# Patient Record
Sex: MALE | Race: WHITE | NOT HISPANIC OR LATINO
[De-identification: names, ages, dates, MRNs, and addresses within clinical notes are randomized per-mention and may not be internally consistent; named-entity substitution may affect disease eponyms.]

---

## 2020-12-11 ENCOUNTER — TRANSCRIPTION ENCOUNTER (OUTPATIENT)
Age: 65
End: 2020-12-11

## 2021-01-14 ENCOUNTER — APPOINTMENT (OUTPATIENT)
Dept: INTERNAL MEDICINE | Facility: CLINIC | Age: 66
End: 2021-01-14
Payer: COMMERCIAL

## 2021-01-14 ENCOUNTER — NON-APPOINTMENT (OUTPATIENT)
Age: 66
End: 2021-01-14

## 2021-01-14 VITALS
HEIGHT: 69 IN | TEMPERATURE: 98.8 F | RESPIRATION RATE: 15 BRPM | HEART RATE: 86 BPM | SYSTOLIC BLOOD PRESSURE: 142 MMHG | BODY MASS INDEX: 31.4 KG/M2 | OXYGEN SATURATION: 97 % | WEIGHT: 212 LBS | DIASTOLIC BLOOD PRESSURE: 100 MMHG

## 2021-01-14 DIAGNOSIS — Z80.0 FAMILY HISTORY OF MALIGNANT NEOPLASM OF DIGESTIVE ORGANS: ICD-10-CM

## 2021-01-14 DIAGNOSIS — Z87.730 PERSONAL HISTORY OF (CORRECTED) CLEFT LIP AND PALATE: ICD-10-CM

## 2021-01-14 DIAGNOSIS — H50.9 UNSPECIFIED STRABISMUS: ICD-10-CM

## 2021-01-14 DIAGNOSIS — Z82.0 FAMILY HISTORY OF EPILEPSY AND OTHER DISEASES OF THE NERVOUS SYSTEM: ICD-10-CM

## 2021-01-14 DIAGNOSIS — Z80.1 FAMILY HISTORY OF MALIGNANT NEOPLASM OF TRACHEA, BRONCHUS AND LUNG: ICD-10-CM

## 2021-01-14 DIAGNOSIS — Z86.69 PERSONAL HISTORY OF OTHER DISEASES OF THE NERVOUS SYSTEM AND SENSE ORGANS: ICD-10-CM

## 2021-01-14 PROCEDURE — 93000 ELECTROCARDIOGRAM COMPLETE: CPT

## 2021-01-14 PROCEDURE — 99386 PREV VISIT NEW AGE 40-64: CPT | Mod: 25

## 2021-01-14 PROCEDURE — 99213 OFFICE O/P EST LOW 20 MIN: CPT | Mod: 25

## 2021-01-14 PROCEDURE — 96127 BRIEF EMOTIONAL/BEHAV ASSMT: CPT

## 2021-01-14 PROCEDURE — 99072 ADDL SUPL MATRL&STAF TM PHE: CPT

## 2021-01-14 PROCEDURE — 36415 COLL VENOUS BLD VENIPUNCTURE: CPT

## 2021-01-14 RX ORDER — ELECTROLYTES/DEXTROSE
SOLUTION, ORAL ORAL DAILY
Qty: 30 | Refills: 0 | Status: ACTIVE | COMMUNITY
Start: 2021-01-14

## 2021-01-14 NOTE — HEALTH RISK ASSESSMENT
[Yes] : In the past 12 months have you used drugs other than those required for medical reasons? Yes [0] : 2) Feeling down, depressed, or hopeless: Not at all (0) [Patient reported colonoscopy was normal] : Patient reported colonoscopy was normal [HIV test declined] : HIV test declined [Hepatitis C test offered] : Hepatitis C test offered [Employed] : employed [] :  [] : No [de-identified] : rare [[id number]] : 0 [de-identified] : marijuana occasionally [FreeTextEntry2] :  [ColonoscopyDate] : 01/10

## 2021-01-14 NOTE — PHYSICAL EXAM
[No Acute Distress] : no acute distress [Well Nourished] : well nourished [Well Developed] : well developed [Well-Appearing] : well-appearing [Normal Voice/Communication] : normal voice/communication [Normal Sclera/Conjunctiva] : normal sclera/conjunctiva [PERRL] : pupils equal round and reactive to light [EOMI] : extraocular movements intact [Normal Outer Ear/Nose] : the outer ears and nose were normal in appearance [Normal Oropharynx] : the oropharynx was normal [Normal Nasal Mucosa] : the nasal mucosa was normal [Normal TMs] : both tympanic membranes were normal [No JVD] : no jugular venous distention [No Lymphadenopathy] : no lymphadenopathy [Supple] : supple [Thyroid Normal, No Nodules] : the thyroid was normal and there were no nodules present [No Respiratory Distress] : no respiratory distress  [No Accessory Muscle Use] : no accessory muscle use [Clear to Auscultation] : lungs were clear to auscultation bilaterally [Normal Rate] : normal rate  [Regular Rhythm] : with a regular rhythm [Normal S1, S2] : normal S1 and S2 [No Murmur] : no murmur heard [No Carotid Bruits] : no carotid bruits [No Abdominal Bruit] : a ~M bruit was not heard ~T in the abdomen [No Edema] : there was no peripheral edema [No Palpable Aorta] : no palpable aorta [No Extremity Clubbing/Cyanosis] : no extremity clubbing/cyanosis [Soft] : abdomen soft [Non Tender] : non-tender [Non-distended] : non-distended [No Masses] : no abdominal mass palpated [No HSM] : no HSM [Normal Bowel Sounds] : normal bowel sounds [No CVA Tenderness] : no CVA  tenderness [No Spinal Tenderness] : no spinal tenderness [No Joint Swelling] : no joint swelling [No Rash] : no rash [No Skin Lesions] : no skin lesions [No Focal Deficits] : no focal deficits [Normal Affect] : the affect was normal [Alert and Oriented x3] : oriented to person, place, and time [Normal Mood] : the mood was normal [Normal Insight/Judgement] : insight and judgment were intact [de-identified] : Wears hearing aids

## 2021-01-14 NOTE — HISTORY OF PRESENT ILLNESS
[de-identified] : Here for CPE and to establish care.  he has not seen a doctor in 4-5 years\par \par He states he feels well overall\par \par He does report that he has erectile dysfunction.  He reports he has difficulty and getting and maintaining erections.  He reports his wife recently was treated for Hodgkin's lymphoma.  He states that for approximately 1 year they do not have any sexual activity.  He states now that she is in remission they have tried again but he has had erectile problems.  He denies feeling depressed or anxious\par \par He denies chest pain, shortness of breath, palpitations\par \par He denies history of heart disease

## 2021-01-14 NOTE — REVIEW OF SYSTEMS
[Hearing Loss] : hearing loss [Negative] : Neurological [Fever] : no fever [Chills] : no chills [Fatigue] : no fatigue [Recent Change In Weight] : ~T no recent weight change [Earache] : no earache [Nasal Discharge] : no nasal discharge [Sore Throat] : no sore throat [Chest Pain] : no chest pain [Palpitations] : no palpitations [Lower Ext Edema] : no lower extremity edema [Shortness Of Breath] : no shortness of breath [Wheezing] : no wheezing [Cough] : no cough [Dyspnea on Exertion] : not dyspnea on exertion [Abdominal Pain] : no abdominal pain [Nausea] : no nausea [Diarrhea] : no diarrhea [Vomiting] : no vomiting [Anxiety] : no anxiety [Depression] : no depression [FreeTextEntry8] : + ED

## 2021-01-14 NOTE — ASSESSMENT
[FreeTextEntry1] : \par Uncontrolled hypertension:\par -Risks of hypertension discussed with patient at length\par -I have advised a low-fat, low-cholesterol, low-salt, low-carb diet\par -I have advised he cut down on caffeine intake\par -Weight loss advised\par -Start amlodipine 5 mg daily  (R/B/A/side effects discussed)\par -Follow-up 4 to 6 weeks for BP check\par -We will check fasting labs\par \par Obesity:\par -BMI 31\par -risks of obesity discussed\par -benefits of weight loss discussed\par -low fat/low chol diet and low carbohydrate diet advised\par -small portion sizes encouraged\par -I advised avoidance of sugary drinks\par -weight loss advised\par \par Abnormal EKG:\par -EKG today normal sinus rhythm at 73 with PVCs, Q waves in III and aVF, poor R wave progression\par -He denies chest pain, shortness of breath, fatigue, dyspnea on exertion, palpitations\par -I have advised that he see cardiology-referred today\par \par Erectile dysfunction:\par -We will check labs including testosterone\par -Given abnormal EKG and uncontrolled hypertension I have advised he see cardiology prior to prescribing medications for ED\par \par HCM:\par \par CPE 2021\par \par Depression screenin2021 PHQ 2 score 0\par \par EKG 2021\par \par Flu shot: Advised 2021-he declined\par \par Tdap: Advised 2021-he declined\par \par Shingles vaccine: Advised 2021-he declined\par \par HIV testing: offered 2021-he declined\par \par Hepatitis C screening: Ordered today\par \par Colonoscopy: Last colonoscopy was in -I advised him that he is due for repeat colonoscopy–will refer to GI\par \par FIT test kit: Given to patient today\par \par PSA: We will check today\par

## 2021-01-15 ENCOUNTER — TRANSCRIPTION ENCOUNTER (OUTPATIENT)
Age: 66
End: 2021-01-15

## 2021-01-15 ENCOUNTER — NON-APPOINTMENT (OUTPATIENT)
Age: 66
End: 2021-01-15

## 2021-01-15 ENCOUNTER — APPOINTMENT (OUTPATIENT)
Dept: CARDIOLOGY | Facility: CLINIC | Age: 66
End: 2021-01-15
Payer: COMMERCIAL

## 2021-01-15 VITALS
HEIGHT: 69 IN | TEMPERATURE: 97.7 F | DIASTOLIC BLOOD PRESSURE: 98 MMHG | BODY MASS INDEX: 31.4 KG/M2 | WEIGHT: 212 LBS | OXYGEN SATURATION: 97 % | HEART RATE: 83 BPM | RESPIRATION RATE: 16 BRPM | SYSTOLIC BLOOD PRESSURE: 162 MMHG

## 2021-01-15 DIAGNOSIS — Z12.11 ENCOUNTER FOR SCREENING FOR MALIGNANT NEOPLASM OF COLON: ICD-10-CM

## 2021-01-15 DIAGNOSIS — Z82.49 FAMILY HISTORY OF ISCHEMIC HEART DISEASE AND OTHER DISEASES OF THE CIRCULATORY SYSTEM: ICD-10-CM

## 2021-01-15 DIAGNOSIS — Z13.31 ENCOUNTER FOR SCREENING FOR DEPRESSION: ICD-10-CM

## 2021-01-15 PROCEDURE — 93000 ELECTROCARDIOGRAM COMPLETE: CPT

## 2021-01-15 PROCEDURE — 99244 OFF/OP CNSLTJ NEW/EST MOD 40: CPT

## 2021-01-15 PROCEDURE — 99072 ADDL SUPL MATRL&STAF TM PHE: CPT

## 2021-01-15 NOTE — PHYSICAL EXAM
[Normal Conjunctiva] : the conjunctiva exhibited no abnormalities [General Appearance - In No Acute Distress] : no acute distress [Normal Oral Mucosa] : normal oral mucosa [Auscultation Breath Sounds / Voice Sounds] : lungs were clear to auscultation bilaterally [Abdomen Soft] : soft [Abdomen Tenderness] : non-tender [Nail Clubbing] : no clubbing of the fingernails [Abnormal Walk] : normal gait [Cyanosis, Localized] : no localized cyanosis [Oriented To Time, Place, And Person] : oriented to person, place, and time [Affect] : the affect was normal [FreeTextEntry1] : No JVD, no carotid bruits. [Normal Rate] : normal [Rhythm Regular] : regular [Normal S1] : normal S1 [Normal S2] : normal S2 [S3] : no S3 [S4] : no S4 [II] : a grade 2

## 2021-01-15 NOTE — DISCUSSION/SUMMARY
[FreeTextEntry1] : 1. Check echocardiogram and nuclear stress test given his frequent PVCs, uncontrolled diabetes and hypertension and risk factors for cardiovascular disease. He also has a murmur on exam.\par 2. Check carotid Doppler given his uncontrolled diabetes and hypertension.\par 3. Check Holter monitor to further evaluate his PVCs and evaluate his PVC burden.\par 4. Add telmisartan 40 mg daily for hypertension given his diabetes. Continue amlodipine.\par 5. Monitor BP at home, keep a log and bring to f/u.\par 6. Add rosuvastatin 10 mg daily for his dyslipidemia and diabetes.\par 7. He will follow-up with you for treatment of his new diabetes. Consider SGL2 inhibitor.\par 8.  Patient encouraged to work on diet to lose weight.\par 9.  Patient encouraged to work on a healthy diet high in lean protein, whole grains and vegetables, and lower in white flour and simple sugars.  Consider seeing a nutritionist.\par 10.  Follow up here after testing, and will make further recommendations at that time.

## 2021-01-15 NOTE — HISTORY OF PRESENT ILLNESS
[FreeTextEntry1] : Patient presents today having been in your office for initial evaluation yesterday. As part of that evaluation he had an EKG which was abnormal with some PVCs. Patient feels well and reports no significant physical complaints at this time. He does not do any regular exercise but reports no symptoms within his normal daily activities. He does state that he had an abnormal stress test 10 or more years ago and had a cardiac catheterization at [facility] which showed a "mild blockage". He has not seen any doctors in the last several years. He reports some shortness of breath when he exerts himself which he blames on his age, weight and deconditioning. He also has recently been having some issues with erectile dysfunction which also brought him to your attention. His wife unfortunately was diagnosed with lymphoma a few years ago and he did not have any sexual activity for some time. Thankfully his wife has been cured and now that he is attempting to try again he has been having erectile issues. His blood pressure was noted to be elevated yesterday and he was started on amlodipine. Patient denies chest pain, palpitations, orthopnea, presyncope, syncope.

## 2021-01-15 NOTE — ASSESSMENT
[FreeTextEntry1] : EKG: Sinus rhythm with borderline T wave changes. Frequent PVCs with bigeminy noted.\par \par 65-year-old man with no significant past medical history but who has not been seen by a doctor in several years who presents to me after presenting to your office yesterday and being found to have PVCs on his EKG as well is elevated blood pressure. Today his blood pressure remains elevated. I reviewed his blood work from yesterday and he is significantly diabetic with an A1c of nearly 11. His lipids are also a problem with elevated triglycerides, low HDL and borderline LDL. His LDL is certainly elevated in the setting of diabetes. His EKG again shows PVCs which are fairly frequent. He certainly will need better treatment for his blood pressure as well as ultimately treatment for his diabetes and his lipids. He will need some cardiac testing though given his EKG and his now multiple apparently uncontrolled risk factors for unclear period of time.

## 2021-01-17 ENCOUNTER — TRANSCRIPTION ENCOUNTER (OUTPATIENT)
Age: 66
End: 2021-01-17

## 2021-01-18 LAB
25(OH)D3 SERPL-MCNC: 18.6 NG/ML
ALBUMIN SERPL ELPH-MCNC: 4.3 G/DL
ALP BLD-CCNC: 83 U/L
ALT SERPL-CCNC: 32 U/L
ANION GAP SERPL CALC-SCNC: 14 MMOL/L
APPEARANCE: ABNORMAL
AST SERPL-CCNC: 21 U/L
BACTERIA: NEGATIVE
BASOPHILS # BLD AUTO: 0.05 K/UL
BASOPHILS NFR BLD AUTO: 0.8 %
BILIRUB SERPL-MCNC: 0.4 MG/DL
BILIRUBIN URINE: NEGATIVE
BLOOD URINE: NEGATIVE
BUN SERPL-MCNC: 12 MG/DL
CALCIUM SERPL-MCNC: 9.6 MG/DL
CHLORIDE SERPL-SCNC: 105 MMOL/L
CHOLEST SERPL-MCNC: 212 MG/DL
CO2 SERPL-SCNC: 18 MMOL/L
COLOR: NORMAL
CREAT SERPL-MCNC: 0.79 MG/DL
EOSINOPHIL # BLD AUTO: 0.1 K/UL
EOSINOPHIL NFR BLD AUTO: 1.6 %
ERYTHROCYTE [SEDIMENTATION RATE] IN BLOOD BY WESTERGREN METHOD: 8 MM/HR
ESTIMATED AVERAGE GLUCOSE: 263 MG/DL
GLUCOSE QUALITATIVE U: ABNORMAL
GLUCOSE SERPL-MCNC: 285 MG/DL
HBA1C MFR BLD HPLC: 10.8 %
HCT VFR BLD CALC: 49.2 %
HCV AB SER QL: NONREACTIVE
HCV S/CO RATIO: 0.06 S/CO
HDLC SERPL-MCNC: 34 MG/DL
HGB BLD-MCNC: 16.5 G/DL
HYALINE CASTS: 1 /LPF
IMM GRANULOCYTES NFR BLD AUTO: 0.3 %
KETONES URINE: NORMAL
LDLC SERPL CALC-MCNC: 123 MG/DL
LEUKOCYTE ESTERASE URINE: NEGATIVE
LYMPHOCYTES # BLD AUTO: 1.7 K/UL
LYMPHOCYTES NFR BLD AUTO: 27.6 %
MAN DIFF?: NORMAL
MCHC RBC-ENTMCNC: 29.3 PG
MCHC RBC-ENTMCNC: 33.5 GM/DL
MCV RBC AUTO: 87.2 FL
MICROSCOPIC-UA: NORMAL
MONOCYTES # BLD AUTO: 0.36 K/UL
MONOCYTES NFR BLD AUTO: 5.8 %
NEUTROPHILS # BLD AUTO: 3.93 K/UL
NEUTROPHILS NFR BLD AUTO: 63.9 %
NITRITE URINE: NEGATIVE
NONHDLC SERPL-MCNC: 177 MG/DL
PH URINE: 5
PLATELET # BLD AUTO: 201 K/UL
POTASSIUM SERPL-SCNC: 4.3 MMOL/L
PROT SERPL-MCNC: 6.9 G/DL
PROTEIN URINE: NEGATIVE
PSA SERPL-MCNC: 0.98 NG/ML
RBC # BLD: 5.64 M/UL
RBC # FLD: 13.2 %
RED BLOOD CELLS URINE: 0 /HPF
SODIUM SERPL-SCNC: 137 MMOL/L
SPECIFIC GRAVITY URINE: 1.03
SQUAMOUS EPITHELIAL CELLS: 0 /HPF
T4 FREE SERPL-MCNC: 1 NG/DL
TRIGL SERPL-MCNC: 271 MG/DL
TSH SERPL-ACNC: 1.81 UIU/ML
UROBILINOGEN URINE: NORMAL
WBC # FLD AUTO: 6.16 K/UL
WHITE BLOOD CELLS URINE: 0 /HPF

## 2021-01-19 LAB
TESTOST BND SERPL-MCNC: 4.5 PG/ML
TESTOST SERPL-MCNC: 153.2 NG/DL

## 2021-01-20 ENCOUNTER — NON-APPOINTMENT (OUTPATIENT)
Age: 66
End: 2021-01-20

## 2021-01-26 ENCOUNTER — APPOINTMENT (OUTPATIENT)
Dept: ENDOCRINOLOGY | Facility: CLINIC | Age: 66
End: 2021-01-26
Payer: COMMERCIAL

## 2021-01-26 VITALS
HEART RATE: 85 BPM | HEIGHT: 69 IN | RESPIRATION RATE: 15 BRPM | BODY MASS INDEX: 30.36 KG/M2 | SYSTOLIC BLOOD PRESSURE: 122 MMHG | WEIGHT: 205 LBS | DIASTOLIC BLOOD PRESSURE: 76 MMHG | OXYGEN SATURATION: 98 % | TEMPERATURE: 99.2 F

## 2021-01-26 LAB — GLUCOSE BLDC GLUCOMTR-MCNC: 275

## 2021-01-26 PROCEDURE — 82962 GLUCOSE BLOOD TEST: CPT

## 2021-01-26 PROCEDURE — 99072 ADDL SUPL MATRL&STAF TM PHE: CPT

## 2021-01-26 PROCEDURE — 99204 OFFICE O/P NEW MOD 45 MIN: CPT | Mod: 25

## 2021-02-01 ENCOUNTER — APPOINTMENT (OUTPATIENT)
Dept: CARDIOLOGY | Facility: CLINIC | Age: 66
End: 2021-02-01

## 2021-02-02 NOTE — PHYSICAL EXAM
[Alert] : alert [No Acute Distress] : no acute distress [Normal Sclera/Conjunctiva] : normal sclera/conjunctiva [PERRL] : pupils equal, round and reactive to light [Normal Outer Ear/Nose] : the ears and nose were normal in appearance [No Neck Mass] : no neck mass was observed [Thyroid Not Enlarged] : the thyroid was not enlarged [No Respiratory Distress] : no respiratory distress [Regular Rhythm] : with a regular rhythm [Carotids Normal] : carotid pulses were normal with no bruits [Not Tender] : non-tender [No Stigmata of Cushings Syndrome] : no stigmata of Cushings Syndrome [Normal Gait] : normal gait [No Joint Swelling] : no joint swelling seen [No Tremors] : no tremors [Oriented x3] : oriented to person, place, and time [Kyphosis] : no kyphosis present [Foot Ulcers] : no foot ulcers [Acne] : no acne

## 2021-02-02 NOTE — ASSESSMENT
[Diabetes Foot Care] : diabetes foot care [Long Term Vascular Complications] : long term vascular complications of diabetes [Carbohydrate Consistent Diet] : carbohydrate consistent diet [Importance of Diet and Exercise] : importance of diet and exercise to improve glycemic control, achieve weight loss and improve cardiovascular health [Exercise/Effect on Glucose] : exercise/effect on glucose [Self Monitoring of Blood Glucose] : self monitoring of blood glucose [Retinopathy Screening] : Patient was referred to ophthalmology for retinopathy screening [Weight Loss] : weight loss [Diabetic Medications] : Risks and benefits of diabetic medications were discussed [FreeTextEntry1] : Diet and exercise was discussed\par FS glucose was 275- post [place] juice\par  He will monitor his glucose daily- he will call insurancw for a noame of meter\par We will continue the Metformin therapy\par Dietary consult\par Low carb diet and wt loss discussed.\par Ex daily\par Return in 3 mo

## 2021-02-02 NOTE — HISTORY OF PRESENT ILLNESS
[FreeTextEntry1] : 65 yoM with recent DX of Type 2 Diabetes. He had noticed nocturia-1x/night.He does little exercise and was eating large bowls of pasta. His wt has been stable\par FH- neg for DM\par He has lost  7 lbs x 2 wks due to diet\par  No blurry vision; has chronic pain in L second and third toes [Continuous Glucose Monitoring] : Continuous Glucose Monitoring: No [Finger Stick] : Finger Stick: No

## 2021-02-15 ENCOUNTER — TRANSCRIPTION ENCOUNTER (OUTPATIENT)
Age: 66
End: 2021-02-15

## 2021-02-17 ENCOUNTER — TRANSCRIPTION ENCOUNTER (OUTPATIENT)
Age: 66
End: 2021-02-17

## 2021-02-17 ENCOUNTER — APPOINTMENT (OUTPATIENT)
Dept: CARDIOLOGY | Facility: CLINIC | Age: 66
End: 2021-02-17
Payer: COMMERCIAL

## 2021-02-17 PROCEDURE — 93880 EXTRACRANIAL BILAT STUDY: CPT

## 2021-02-17 PROCEDURE — 99072 ADDL SUPL MATRL&STAF TM PHE: CPT

## 2021-02-17 PROCEDURE — 93306 TTE W/DOPPLER COMPLETE: CPT

## 2021-02-17 RX ADMIN — PERFLUTREN MG/ML: 6.52 INJECTION, SUSPENSION INTRAVENOUS at 00:00

## 2021-02-18 ENCOUNTER — TRANSCRIPTION ENCOUNTER (OUTPATIENT)
Age: 66
End: 2021-02-18

## 2021-02-18 LAB — HEMOCCULT STL QL IA: NEGATIVE

## 2021-02-22 RX ORDER — PERFLUTREN 6.52 MG/ML
6.52 INJECTION, SUSPENSION INTRAVENOUS
Qty: 2 | Refills: 0 | Status: COMPLETED | OUTPATIENT
Start: 2021-02-17

## 2021-03-01 ENCOUNTER — APPOINTMENT (OUTPATIENT)
Dept: INTERNAL MEDICINE | Facility: CLINIC | Age: 66
End: 2021-03-01
Payer: COMMERCIAL

## 2021-03-01 VITALS
HEART RATE: 55 BPM | RESPIRATION RATE: 15 BRPM | DIASTOLIC BLOOD PRESSURE: 74 MMHG | WEIGHT: 195 LBS | OXYGEN SATURATION: 99 % | HEIGHT: 69 IN | SYSTOLIC BLOOD PRESSURE: 120 MMHG | TEMPERATURE: 99 F | BODY MASS INDEX: 28.88 KG/M2

## 2021-03-01 DIAGNOSIS — I10 ESSENTIAL (PRIMARY) HYPERTENSION: ICD-10-CM

## 2021-03-01 DIAGNOSIS — R94.31 ABNORMAL ELECTROCARDIOGRAM [ECG] [EKG]: ICD-10-CM

## 2021-03-01 LAB — GLUCOSE BLDC GLUCOMTR-MCNC: 144

## 2021-03-01 PROCEDURE — 36415 COLL VENOUS BLD VENIPUNCTURE: CPT

## 2021-03-01 PROCEDURE — 99072 ADDL SUPL MATRL&STAF TM PHE: CPT

## 2021-03-01 PROCEDURE — 99214 OFFICE O/P EST MOD 30 MIN: CPT | Mod: 25

## 2021-03-01 PROCEDURE — 82962 GLUCOSE BLOOD TEST: CPT

## 2021-03-01 NOTE — PHYSICAL EXAM
[No Acute Distress] : no acute distress [Well Nourished] : well nourished [Well Developed] : well developed [Well-Appearing] : well-appearing [Normal Voice/Communication] : normal voice/communication [Normal Sclera/Conjunctiva] : normal sclera/conjunctiva [PERRL] : pupils equal round and reactive to light [Normal Oropharynx] : the oropharynx was normal [No JVD] : no jugular venous distention [No Lymphadenopathy] : no lymphadenopathy [Supple] : supple [Thyroid Normal, No Nodules] : the thyroid was normal and there were no nodules present [No Respiratory Distress] : no respiratory distress  [No Accessory Muscle Use] : no accessory muscle use [Clear to Auscultation] : lungs were clear to auscultation bilaterally [Normal Rate] : normal rate  [Regular Rhythm] : with a regular rhythm [Normal S1, S2] : normal S1 and S2 [No Murmur] : no murmur heard [No Abdominal Bruit] : a ~M bruit was not heard ~T in the abdomen [No Edema] : there was no peripheral edema [No Palpable Aorta] : no palpable aorta [No Extremity Clubbing/Cyanosis] : no extremity clubbing/cyanosis [No Rash] : no rash [Normal Affect] : the affect was normal [Alert and Oriented x3] : oriented to person, place, and time [Normal Mood] : the mood was normal [Normal Insight/Judgement] : insight and judgment were intact

## 2021-03-01 NOTE — REVIEW OF SYSTEMS
[Negative] : Psychiatric [Fever] : no fever [Chills] : no chills [Fatigue] : no fatigue [Chest Pain] : no chest pain [Palpitations] : no palpitations [Lower Ext Edema] : no lower extremity edema [Shortness Of Breath] : no shortness of breath [Wheezing] : no wheezing [Cough] : no cough [Dyspnea on Exertion] : not dyspnea on exertion [Abdominal Pain] : no abdominal pain [Nausea] : no nausea [Diarrhea] : no diarrhea [Vomiting] : no vomiting

## 2021-03-01 NOTE — ASSESSMENT
[FreeTextEntry1] : \par \par DM type 2:\par -FS today 144\par -now followed by Dr Carroll (Endocrine)\par -hgA1c 10.8 2021 and he is now on metformin 500mg BID\par -will check urine A:C\par -now on crestor\par -Ophthalmology-referred previously\par -Foot exam: will check at next visit\par -he ha slost 17 lbs with diet modification\par -his home FS have improved\par \par Hypertension:\par -now on amlodipine 5 mg daily and telmisartan 40mg daily\par -check cmp\par \par Obesity:\par -he has now lost 17 lbs andf BMI 28.8\par -low fat/low chol diet and low carbohydrate diet advised\par -small portion sizes encouraged\par -I advised avoidance of sugary drinks\par -weight loss advised\par \par Abnormal EKG:\par -seeing cardiology and scheduled for stress test this week\par -ECHO, carotid, and holter has been completed\par \par Erectile dysfunction?low testosterone\par -Given abnormal EKG and uncontrolled hypertension I have advised he see cardiology prior to prescribing medications for ED\par -f/u with Endocrine\par \par HCM:\par \par CPE 2021\par \par Depression screenin2021 PHQ 2 score 0\par \par EKG 2021\par \par Flu shot: Advised 2021-he declined\par \par Tdap: Advised 2021-he declined\par \par Shingles vaccine: Advised 2021-he declined\par \par Covid vaccine: received first shot last week\par \par HIV testing: offered 2021-he declined\par \par Hepatitis C screenin2021 negative\par \par Colonoscopy: Last colonoscopy was in -I advised him that he is due for repeat colonoscopy–he was previously referred to GI-he states he will make appt\par \par FIT test: 2021 negative\par \par PSA: 0.98 2021\par \par F/U 3 months.  Labs drawn in office today\par

## 2021-03-01 NOTE — HISTORY OF PRESENT ILLNESS
[de-identified] : Here for follow up of his DM\par \par he states he has been eating healthy and lost 17 lbs\par \par FS at home 118-180\par \par He states he feels well and denies any complaints

## 2021-03-02 LAB
ALBUMIN SERPL ELPH-MCNC: 4.7 G/DL
ALP BLD-CCNC: 79 U/L
ALT SERPL-CCNC: 27 U/L
ANION GAP SERPL CALC-SCNC: 14 MMOL/L
AST SERPL-CCNC: 17 U/L
BILIRUB SERPL-MCNC: 0.6 MG/DL
BUN SERPL-MCNC: 18 MG/DL
CALCIUM SERPL-MCNC: 9.9 MG/DL
CHLORIDE SERPL-SCNC: 104 MMOL/L
CO2 SERPL-SCNC: 22 MMOL/L
CREAT SERPL-MCNC: 1.05 MG/DL
GLUCOSE SERPL-MCNC: 136 MG/DL
POTASSIUM SERPL-SCNC: 4.2 MMOL/L
PROT SERPL-MCNC: 7.5 G/DL
SODIUM SERPL-SCNC: 140 MMOL/L

## 2021-03-03 ENCOUNTER — APPOINTMENT (OUTPATIENT)
Dept: CARDIOLOGY | Facility: CLINIC | Age: 66
End: 2021-03-03
Payer: COMMERCIAL

## 2021-03-03 ENCOUNTER — TRANSCRIPTION ENCOUNTER (OUTPATIENT)
Age: 66
End: 2021-03-03

## 2021-03-03 ENCOUNTER — RX RENEWAL (OUTPATIENT)
Age: 66
End: 2021-03-03

## 2021-03-03 PROCEDURE — 78452 HT MUSCLE IMAGE SPECT MULT: CPT

## 2021-03-03 PROCEDURE — A9500: CPT

## 2021-03-03 PROCEDURE — 93015 CV STRESS TEST SUPVJ I&R: CPT

## 2021-03-03 PROCEDURE — 99072 ADDL SUPL MATRL&STAF TM PHE: CPT

## 2021-03-03 RX ORDER — REGADENOSON 0.08 MG/ML
0.4 INJECTION, SOLUTION INTRAVENOUS
Qty: 4 | Refills: 0 | Status: COMPLETED | OUTPATIENT
Start: 2021-03-03

## 2021-03-03 RX ADMIN — REGADENOSON 0 MG/5ML: 0.08 INJECTION, SOLUTION INTRAVENOUS at 00:00

## 2021-03-10 RX ORDER — KIT FOR THE PREPARATION OF TECHNETIUM TC99M SESTAMIBI 1 MG/5ML
INJECTION, POWDER, LYOPHILIZED, FOR SOLUTION PARENTERAL
Refills: 0 | Status: COMPLETED | OUTPATIENT
Start: 2021-03-10

## 2021-03-10 RX ADMIN — KIT FOR THE PREPARATION OF TECHNETIUM TC99M SESTAMIBI 0: 1 INJECTION, POWDER, LYOPHILIZED, FOR SOLUTION PARENTERAL at 00:00

## 2021-03-11 ENCOUNTER — APPOINTMENT (OUTPATIENT)
Dept: CARDIOLOGY | Facility: CLINIC | Age: 66
End: 2021-03-11
Payer: COMMERCIAL

## 2021-03-11 VITALS
SYSTOLIC BLOOD PRESSURE: 124 MMHG | BODY MASS INDEX: 28.44 KG/M2 | HEART RATE: 79 BPM | WEIGHT: 192 LBS | HEIGHT: 69 IN | RESPIRATION RATE: 16 BRPM | TEMPERATURE: 97.3 F | OXYGEN SATURATION: 100 % | DIASTOLIC BLOOD PRESSURE: 76 MMHG

## 2021-03-11 PROCEDURE — 99072 ADDL SUPL MATRL&STAF TM PHE: CPT

## 2021-03-11 PROCEDURE — 99214 OFFICE O/P EST MOD 30 MIN: CPT

## 2021-03-11 RX ORDER — AMLODIPINE BESYLATE 5 MG/1
5 TABLET ORAL DAILY
Qty: 90 | Refills: 1 | Status: DISCONTINUED | COMMUNITY
Start: 2021-01-14 | End: 2021-03-11

## 2021-03-11 NOTE — ASSESSMENT
[FreeTextEntry1] : Holter monitor January 15, 2021 demonstrated presenting rhythm of sinus with average heart rate of 78 bpm, maximum 109, minimum 61 bpm. Frequent PVCs totaling 98 an hour with bigeminy and a total of 46 couplets were noted. Occasional PACs totaling 26 an hour with some bigeminy noted as well.\par \par Echocardiogram February 17, 2021 demonstrated left ventricle normal in size with mildly reduced function and an ejection fraction of 45 to 50%. Mild mitral valve prolapse with trace mitral regurgitation noted.\par \par Carotid Doppler February 17, 2021 showed mild plaque bilaterally and mild stenosis bilaterally.\par \par Nuclear stress test March 3, 2021 was a pharmacologic study which was tolerated well. Blood pressure sponsor was normal. EKG showed no evidence of ischemia. Evaluation of nuclear imaging showed no evidence of ischemia or infarct and ejection fraction of 53%.\par \par 65-year-old man with past medical history of hypertension, diabetes who presents to me for follow-up and review of his testing for initial evaluation of PVCs and hypertension. Cardiac testing shows evidence of mild cardiomyopathy on echocardiogram and stress testing with no evidence of ischemia or infarction. He does have very frequent PVCs and certainly this could be related. Carotid shows only mild disease. I would like to change his amlodipine to metoprolol to try and suppress PVCs and to treat his cardiomyopathy. I will not change telmisartan for now but would consider the addition of Entresto instead. He should have repeat blood work for lipids now that he is on atorvastatin which he is tolerating. There is no evidence of acute heart failure at this time.

## 2021-03-11 NOTE — HISTORY OF PRESENT ILLNESS
[FreeTextEntry1] : Patient presents back to the office today feeling well and offering no complaints. He continues to be active and is able to do so without limitation. He is actually managed to lose 20 pounds through improved diet as last I saw him. Blood pressures have been in the 100s to 110s over 60s to 70s since the addition of telmisartan. He tolerated that and the rosuvastatin without a problem. Patient denies chest pain, shortness of breath, palpitations, orthopnea, presyncope, syncope.

## 2021-03-11 NOTE — PHYSICAL EXAM
[General Appearance - In No Acute Distress] : no acute distress [Normal Conjunctiva] : the conjunctiva exhibited no abnormalities [Normal Oral Mucosa] : normal oral mucosa [Auscultation Breath Sounds / Voice Sounds] : lungs were clear to auscultation bilaterally [Abdomen Soft] : soft [Abdomen Tenderness] : non-tender [Abnormal Walk] : normal gait [Nail Clubbing] : no clubbing of the fingernails [Cyanosis, Localized] : no localized cyanosis [Oriented To Time, Place, And Person] : oriented to person, place, and time [Affect] : the affect was normal [Normal Rate] : normal [Rhythm Regular] : regular [Normal S1] : normal S1 [Normal S2] : normal S2 [II] : a grade 2 [FreeTextEntry1] : No JVD, no carotid bruits. [S3] : no S3 [S4] : no S4

## 2021-03-11 NOTE — DISCUSSION/SUMMARY
[FreeTextEntry1] : 1. No additional cardiac testing at this time. \par 2. Change amlodipine to Toprol-XL 25 mg daily to treat PVCs and cardiomyopathy.  Repeat Holter prior to follow-up.\par 3. Continue other current cardiac meds in doses as noted above for cardiomyopathy, diabetes, dyslipidemia and hypertension.\par 4. Monitor BP at home, keep a log and bring to f/u.\par 5. He will follow-up with you for treatment of his new diabetes. Consider SGL2 inhibitor.\par 6. Patient encouraged to work on diet to lose weight.\par 7. Patient encouraged to work on a healthy diet high in lean protein, whole grains and vegetables, and lower in white flour and simple sugars.  \par 8. Patient is encouraged to exercise at least 30 minutes a day everyday of the week.\par 9. There is no contraindication to him using Viagra or to sexual activity. Instructed him on avoiding nitrates if he is using Viagra.\par 10. Blood work for lipids now on Crestor.\par 11. Follow up here after testing, and will make further recommendations at that time.

## 2021-03-12 ENCOUNTER — TRANSCRIPTION ENCOUNTER (OUTPATIENT)
Age: 66
End: 2021-03-12

## 2021-03-12 RX ORDER — SILDENAFIL 100 MG/1
100 TABLET, FILM COATED ORAL
Qty: 10 | Refills: 1 | Status: ACTIVE | COMMUNITY
Start: 2021-03-12 | End: 1900-01-01

## 2021-03-23 ENCOUNTER — TRANSCRIPTION ENCOUNTER (OUTPATIENT)
Age: 66
End: 2021-03-23

## 2021-04-05 ENCOUNTER — RX RENEWAL (OUTPATIENT)
Age: 66
End: 2021-04-05

## 2021-04-07 ENCOUNTER — TRANSCRIPTION ENCOUNTER (OUTPATIENT)
Age: 66
End: 2021-04-07

## 2021-04-27 ENCOUNTER — APPOINTMENT (OUTPATIENT)
Dept: ENDOCRINOLOGY | Facility: CLINIC | Age: 66
End: 2021-04-27
Payer: COMMERCIAL

## 2021-04-27 VITALS
SYSTOLIC BLOOD PRESSURE: 112 MMHG | HEART RATE: 74 BPM | BODY MASS INDEX: 28.14 KG/M2 | WEIGHT: 190 LBS | DIASTOLIC BLOOD PRESSURE: 72 MMHG | HEIGHT: 69 IN | OXYGEN SATURATION: 96 % | TEMPERATURE: 96.5 F | RESPIRATION RATE: 15 BRPM

## 2021-04-27 LAB — GLUCOSE BLDC GLUCOMTR-MCNC: 166

## 2021-04-27 PROCEDURE — 99215 OFFICE O/P EST HI 40 MIN: CPT | Mod: 25

## 2021-04-27 PROCEDURE — 82962 GLUCOSE BLOOD TEST: CPT | Mod: NC

## 2021-04-27 PROCEDURE — 99072 ADDL SUPL MATRL&STAF TM PHE: CPT

## 2021-04-27 RX ORDER — TELMISARTAN 40 MG/1
40 TABLET ORAL DAILY
Qty: 90 | Refills: 1 | Status: COMPLETED | COMMUNITY
Start: 2021-01-15 | End: 2021-04-27

## 2021-04-27 RX ORDER — ERGOCALCIFEROL 1.25 MG/1
1.25 MG CAPSULE ORAL
Qty: 12 | Refills: 0 | Status: COMPLETED | COMMUNITY
Start: 2021-01-18 | End: 2021-04-27

## 2021-04-27 NOTE — REVIEW OF SYSTEMS
[Erectile Dysfunction] : erectile dysfunction [Negative] : Psychiatric [Lower Ext Edema] : no lower extremity edema [Decreased Libido] : no decreased libido [Pain/Numbness of Digits] : no pain/numbness of digits

## 2021-04-27 NOTE — PHYSICAL EXAM
[Alert] : alert [No Acute Distress] : no acute distress [Normal Sclera/Conjunctiva] : normal sclera/conjunctiva [Normal Outer Ear/Nose] : the ears and nose were normal in appearance [No Neck Mass] : no neck mass was observed [Thyroid Not Enlarged] : the thyroid was not enlarged [No Respiratory Distress] : no respiratory distress

## 2021-04-27 NOTE — HISTORY OF PRESENT ILLNESS
[Finger Stick] : Finger Stick: Yes [FreeTextEntry1] : Pt with Type 2 DM x 1yr- on metformin 500mg  bid\par On low carb diet\par Ex- little\par No retinopathy or neuropathy or CKD\par uses one touch verio [Hypoglycemia] : Patient is not hypoglycemic. [de-identified] : 130 [de-identified] : 130 [de-identified] : 134

## 2021-04-27 NOTE — ASSESSMENT
[FreeTextEntry1] : 1- Cont metformin\par 2- diet and ex\par 3- eye and foot care\par 4- labs drawn\par Ret 3 mo  \par \par Will add testosterone gel for hypogonadism/ ED

## 2021-04-28 LAB
ALBUMIN SERPL ELPH-MCNC: 4.6 G/DL
ALP BLD-CCNC: 77 U/L
ALT SERPL-CCNC: 29 U/L
ANION GAP SERPL CALC-SCNC: 11 MMOL/L
AST SERPL-CCNC: 23 U/L
BILIRUB SERPL-MCNC: 0.6 MG/DL
BUN SERPL-MCNC: 17 MG/DL
CALCIUM SERPL-MCNC: 9.5 MG/DL
CHLORIDE SERPL-SCNC: 104 MMOL/L
CHOLEST SERPL-MCNC: 124 MG/DL
CO2 SERPL-SCNC: 23 MMOL/L
CREAT SERPL-MCNC: 0.89 MG/DL
ESTIMATED AVERAGE GLUCOSE: 140 MG/DL
GLUCOSE SERPL-MCNC: 166 MG/DL
HBA1C MFR BLD HPLC: 6.5 %
HDLC SERPL-MCNC: 31 MG/DL
LDLC SERPL CALC-MCNC: 57 MG/DL
NONHDLC SERPL-MCNC: 93 MG/DL
POTASSIUM SERPL-SCNC: 4.6 MMOL/L
PROT SERPL-MCNC: 7 G/DL
SODIUM SERPL-SCNC: 138 MMOL/L
TRIGL SERPL-MCNC: 178 MG/DL

## 2021-06-04 ENCOUNTER — APPOINTMENT (OUTPATIENT)
Dept: INTERNAL MEDICINE | Facility: CLINIC | Age: 66
End: 2021-06-04
Payer: COMMERCIAL

## 2021-06-04 VITALS
WEIGHT: 200 LBS | HEIGHT: 69 IN | OXYGEN SATURATION: 99 % | HEART RATE: 63 BPM | RESPIRATION RATE: 15 BRPM | BODY MASS INDEX: 29.62 KG/M2 | SYSTOLIC BLOOD PRESSURE: 126 MMHG | DIASTOLIC BLOOD PRESSURE: 80 MMHG | TEMPERATURE: 96.4 F

## 2021-06-04 DIAGNOSIS — N52.9 MALE ERECTILE DYSFUNCTION, UNSPECIFIED: ICD-10-CM

## 2021-06-04 DIAGNOSIS — Z23 ENCOUNTER FOR IMMUNIZATION: ICD-10-CM

## 2021-06-04 PROCEDURE — 99072 ADDL SUPL MATRL&STAF TM PHE: CPT

## 2021-06-04 PROCEDURE — 99214 OFFICE O/P EST MOD 30 MIN: CPT

## 2021-06-04 NOTE — REVIEW OF SYSTEMS
[Negative] : Psychiatric [Fever] : no fever [Chills] : no chills [Fatigue] : no fatigue [Chest Pain] : no chest pain [Palpitations] : no palpitations [Lower Ext Edema] : no lower extremity edema [Shortness Of Breath] : no shortness of breath [Wheezing] : no wheezing [Cough] : no cough [Dyspnea on Exertion] : not dyspnea on exertion [Abdominal Pain] : no abdominal pain [Nausea] : no nausea [Diarrhea] : no diarrhea [Vomiting] : no vomiting [FreeTextEntry2] : gained 10 lbs [FreeTextEntry8] : + ED

## 2021-06-04 NOTE — ASSESSMENT
[FreeTextEntry1] : \par \par DM type 2:\par -HgA1c 6.5 2021\par -followed by Dr Carroll (Endocrine)\par - metformin 500mg BID\par -will check urine A:C\par -on crestor-LDL 57 2021\par -Ophthalmology-referred previously-he states he will make appt\par -Foot exam: 2021\par -he should adhere to low fat/low cholesterol diet, low carbohydrate diet and weight loss advised\par \par Hypertension:\par -now on Metoprolol XL  and telmisartan 40mg daily\par -BP at goal\par \par Obesity:\par -low fat/low chol diet and low carbohydrate diet advised\par -small portion sizes encouraged\par -I advised avoidance of sugary drinks\par -weight loss advised\par \par Cardiomyopathy/PVCs/Abnormal EKG:\par -seen by cardiology\par -ECHO: EF 45-50%\par -on telmisartan, Toprol XL, crestor\par -has cardiology f/u next week\par -he is asymptomatic\par \par Mild carotid stenosis:\par -carotid US with cardiology less than 50% B/L\par -on statin\par -LDL 57\par -discussed ASA use-he will discus with his cardiologist-he ha appt next week\par \par Erectile dysfunction/low testosterone\par -followed by Endocrine\par -viagra did not help\par -he is still waiting to get testosterone-will have RN reach out to pharmacy to see if there were any issues\par -will refer to urology-Dr Westfall\par \par HCM:\par \par CPE 2021\par \par Depression screenin2021 PHQ 2 score 0\par \par EKG 2021\par \par Flu shot: Advised 2021-he declined\par \par Tdap: Advised 2021-he declined\par \par Shingles vaccine: Advised 2021-he declined\par \par Covid vaccine: (Pf[employer]) 2021 and 3/19/2021\par \par HIV testing: offered 2021-he declined\par \par Hepatitis C screenin2021 negative\par \par Colonoscopy: Last colonoscopy was in -I advised him that he is due for repeat colonoscopy–he was previously referred to GI-I again advised 2021-he states he will make appt\par \par FIT test: 2021 negative\par \par PSA: 0.98 2021\par \par F/U 4 months.  Fasting labs ordered and he will have done at lab in 3 months\par

## 2021-06-04 NOTE — HISTORY OF PRESENT ILLNESS
[de-identified] : Here for follow up of DM and HTN\par \par Here states he is still having ED.  he states viagra did not help.  he states he is still waiting to get testosterone-appears this was ordered 5/26/2021\par \par prior to office visit UI reviewed recent Endocrine and cardiology consult notes.  I also reviewed labs done on 4/27/2021.  I also reviewed results of recent ECHO, stress test and carotid dopplers

## 2021-06-04 NOTE — PHYSICAL EXAM
[No Acute Distress] : no acute distress [Well Nourished] : well nourished [Well Developed] : well developed [Well-Appearing] : well-appearing [Normal Voice/Communication] : normal voice/communication [Normal Sclera/Conjunctiva] : normal sclera/conjunctiva [PERRL] : pupils equal round and reactive to light [Normal Oropharynx] : the oropharynx was normal [No JVD] : no jugular venous distention [No Lymphadenopathy] : no lymphadenopathy [Supple] : supple [Thyroid Normal, No Nodules] : the thyroid was normal and there were no nodules present [No Respiratory Distress] : no respiratory distress  [No Accessory Muscle Use] : no accessory muscle use [Clear to Auscultation] : lungs were clear to auscultation bilaterally [Normal Rate] : normal rate  [Regular Rhythm] : with a regular rhythm [Normal S1, S2] : normal S1 and S2 [No Murmur] : no murmur heard [No Abdominal Bruit] : a ~M bruit was not heard ~T in the abdomen [No Edema] : there was no peripheral edema [No Palpable Aorta] : no palpable aorta [No Extremity Clubbing/Cyanosis] : no extremity clubbing/cyanosis [No Rash] : no rash [Normal Affect] : the affect was normal [Alert and Oriented x3] : oriented to person, place, and time [Normal Mood] : the mood was normal [Normal Insight/Judgement] : insight and judgment were intact [No Carotid Bruits] : no carotid bruits [Soft] : abdomen soft [Non Tender] : non-tender [Non-distended] : non-distended [No Masses] : no abdominal mass palpated [No HSM] : no HSM [Normal Bowel Sounds] : normal bowel sounds [No Skin Lesions] : no skin lesions [No Focal Deficits] : no focal deficits [Comprehensive Foot Exam Normal] : Right and left foot were examined and both feet are normal. No ulcers in either foot. Toes are normal and with full ROM.  Normal tactile sensation with monofilament testing throughout both feet [de-identified] : right lateral strabismus [de-identified] : + 2 DP/PT pulses B/L

## 2021-06-09 ENCOUNTER — APPOINTMENT (OUTPATIENT)
Dept: CARDIOLOGY | Facility: CLINIC | Age: 66
End: 2021-06-09
Payer: COMMERCIAL

## 2021-06-09 PROCEDURE — ZZZZZ: CPT

## 2021-06-14 ENCOUNTER — APPOINTMENT (OUTPATIENT)
Dept: CARDIOLOGY | Facility: CLINIC | Age: 66
End: 2021-06-14
Payer: COMMERCIAL

## 2021-06-14 ENCOUNTER — NON-APPOINTMENT (OUTPATIENT)
Age: 66
End: 2021-06-14

## 2021-06-14 VITALS
SYSTOLIC BLOOD PRESSURE: 129 MMHG | OXYGEN SATURATION: 99 % | TEMPERATURE: 97.5 F | RESPIRATION RATE: 16 BRPM | HEIGHT: 69 IN | HEART RATE: 61 BPM | WEIGHT: 198 LBS | BODY MASS INDEX: 29.33 KG/M2 | DIASTOLIC BLOOD PRESSURE: 76 MMHG

## 2021-06-14 PROCEDURE — 99214 OFFICE O/P EST MOD 30 MIN: CPT

## 2021-06-14 PROCEDURE — 93000 ELECTROCARDIOGRAM COMPLETE: CPT

## 2021-06-14 PROCEDURE — 99072 ADDL SUPL MATRL&STAF TM PHE: CPT

## 2021-06-14 NOTE — PHYSICAL EXAM
[General Appearance - In No Acute Distress] : no acute distress [Normal Conjunctiva] : the conjunctiva exhibited no abnormalities [Normal Oral Mucosa] : normal oral mucosa [Auscultation Breath Sounds / Voice Sounds] : lungs were clear to auscultation bilaterally [Normal Rate] : normal [Rhythm Regular] : regular [Normal S1] : normal S1 [Normal S2] : normal S2 [II] : a grade 2 [Abdomen Soft] : soft [Abdomen Tenderness] : non-tender [Abnormal Walk] : normal gait [Nail Clubbing] : no clubbing of the fingernails [Cyanosis, Localized] : no localized cyanosis [Oriented To Time, Place, And Person] : oriented to person, place, and time [Affect] : the affect was normal [FreeTextEntry1] : No JVD, no carotid bruits. [S3] : no S3 [S4] : no S4

## 2021-06-14 NOTE — HISTORY OF PRESENT ILLNESS
[FreeTextEntry1] : Patient presents back today feeling well with the exception of his issues with erectile dysfunction which is an ongoing problem.  He is looking into testosterone treatment and planning to follow-up with urology.  He tolerated the addition of metoprolol otherwise with no seeming effect on his erectile dysfunction.  He is taking all of his other medication without a problem.  Blood pressures have been in the 110s to 120s over 70s.  Patient denies chest pain, shortness of breath, palpitations, orthopnea, presyncope, syncope.

## 2021-06-14 NOTE — DISCUSSION/SUMMARY
[FreeTextEntry1] : 1. No additional cardiac testing at this time. \par 2. Increase Toprol-XL to 50 mg daily to treat PVCs and cardiomyopathy.  Repeat Holter prior to follow-up.\par 3. Continue other current cardiac meds in doses as noted above for cardiomyopathy, diabetes, dyslipidemia and hypertension.\par 4. Monitor BP at home, keep a log and bring to f/u.\par 5. He will follow-up with his PCP for treatment of his diabetes. \par 6. Patient encouraged to work on diet to lose weight.\par 7. Patient encouraged to work on a healthy diet high in lean protein, whole grains and vegetables, and lower in white flour and simple sugars.  \par 8. Patient is encouraged to exercise at least 30 minutes a day everyday of the week.\par 9. Follow-up with urology and endocrinology for testosterone replacement and other work-up of his erectile dysfunction.\par 10. Follow up here in three months.

## 2021-06-14 NOTE — ASSESSMENT
[FreeTextEntry1] : Holter monitor January 15, 2021 demonstrated presenting rhythm of sinus with average heart rate of 78 bpm, maximum 109, minimum 61 bpm. Frequent PVCs totaling 98 an hour with bigeminy and a total of 46 couplets were noted. Occasional PACs totaling 26 an hour with some bigeminy noted as well.\par \par Echocardiogram February 17, 2021 demonstrated left ventricle normal in size with mildly reduced function and an ejection fraction of 45 to 50%. Mild mitral valve prolapse with trace mitral regurgitation noted.\par \par Carotid Doppler February 17, 2021 showed mild plaque bilaterally and mild stenosis bilaterally.\par \par Nuclear stress test March 3, 2021 was a pharmacologic study which was tolerated well. Blood pressure sponsor was normal. EKG showed no evidence of ischemia. Evaluation of nuclear imaging showed no evidence of ischemia or infarct and ejection fraction of 53%.\par \par EKG: Sinus rhythm with no significant ST or T wave changes.  Frequent PVCs in trigeminy.\par \par 65-year-old man with past medical history of mild nonischemic cardiomyopathy, frequent PVCs, hypertension, diabetes who presents to me for follow-up.  Patient appears to be stable from a cardiac standpoint with no evidence of acute heart failure.  Blood pressures are well controlled.  He is still having frequent PVCs and Holter monitor again showed a PVC burden of over 100/h.  At this time I will increase his metoprolol though I have some concern regarding his erectile dysfunction.  This does appear to predate being on metoprolol and he is following with urology and starting testosterone replacement.  If this is not improved after that follow-up may reconsider but for now I believe the benefit of the metoprolol is more significant.  Lipids are well controlled.  Diabetes appears to be controlled as well.

## 2021-07-27 ENCOUNTER — APPOINTMENT (OUTPATIENT)
Dept: ENDOCRINOLOGY | Facility: CLINIC | Age: 66
End: 2021-07-27

## 2021-08-30 ENCOUNTER — APPOINTMENT (OUTPATIENT)
Dept: CARDIOLOGY | Facility: CLINIC | Age: 66
End: 2021-08-30
Payer: COMMERCIAL

## 2021-08-30 PROCEDURE — ZZZZZ: CPT

## 2021-09-03 ENCOUNTER — NON-APPOINTMENT (OUTPATIENT)
Age: 66
End: 2021-09-03

## 2021-09-20 ENCOUNTER — NON-APPOINTMENT (OUTPATIENT)
Age: 66
End: 2021-09-20

## 2021-09-20 ENCOUNTER — APPOINTMENT (OUTPATIENT)
Dept: CARDIOLOGY | Facility: CLINIC | Age: 66
End: 2021-09-20
Payer: COMMERCIAL

## 2021-09-20 VITALS
WEIGHT: 205 LBS | SYSTOLIC BLOOD PRESSURE: 126 MMHG | HEART RATE: 71 BPM | OXYGEN SATURATION: 97 % | BODY MASS INDEX: 30.36 KG/M2 | HEIGHT: 69 IN | DIASTOLIC BLOOD PRESSURE: 77 MMHG | RESPIRATION RATE: 16 BRPM

## 2021-09-20 PROCEDURE — 93000 ELECTROCARDIOGRAM COMPLETE: CPT

## 2021-09-20 PROCEDURE — 99214 OFFICE O/P EST MOD 30 MIN: CPT

## 2021-09-20 RX ORDER — TESTOSTERONE 20.25 MG/1.25G
20.25 MG/1.25GM GEL TOPICAL
Qty: 1 | Refills: 0 | Status: DISCONTINUED | COMMUNITY
Start: 2021-05-26 | End: 2021-09-20

## 2021-09-20 NOTE — DISCUSSION/SUMMARY
[FreeTextEntry1] : 1. No additional cardiac testing at this time. \par 2. Increase Toprol-XL to 50 mg twice daily to treat PVCs and cardiomyopathy.  Repeat Holter prior to follow-up.\par 3. Continue other current cardiac meds in doses as noted above for cardiomyopathy, diabetes, dyslipidemia and hypertension.\par 4. Monitor BP at home, keep a log and bring to f/u.\par 5. He will follow-up with his PCP for treatment of his diabetes. \par 6. Patient encouraged to work on diet to lose weight.\par 7. Patient encouraged to work on a healthy diet high in lean protein, whole grains and vegetables, and lower in white flour and simple sugars.  \par 8. Patient is encouraged to exercise at least 30 minutes a day everyday of the week.\par 9. Follow-up with urology and endocrinology for testosterone replacement and other work-up of his erectile dysfunction.\par 10. Follow up here in three months.

## 2021-09-20 NOTE — ASSESSMENT
[FreeTextEntry1] : Holter monitor January 15, 2021 demonstrated presenting rhythm of sinus with average heart rate of 78 bpm, maximum 109, minimum 61 bpm. Frequent PVCs totaling 98 an hour with bigeminy and a total of 46 couplets were noted. Occasional PACs totaling 26 an hour with some bigeminy noted as well.\par \par Echocardiogram February 17, 2021 demonstrated left ventricle normal in size with mildly reduced function and an ejection fraction of 45 to 50%. Mild mitral valve prolapse with trace mitral regurgitation noted.\par \par Carotid Doppler February 17, 2021 showed mild plaque bilaterally and mild stenosis bilaterally.\par \par Nuclear stress test March 3, 2021 was a pharmacologic study which was tolerated well. Blood pressure sponsor was normal. EKG showed no evidence of ischemia. Evaluation of nuclear imaging showed no evidence of ischemia or infarct and ejection fraction of 53%.\par \par Holter monitor August 30, 2021 showed a presenting rhythm of sinus with an average heart rate of 67 bpm, maximum 86, minimum 55 bpm.  Frequent PVCs totaling 157/h with some bigeminy, trigeminy and couplets were noted.  No other significant arrhythmia.\par \par EKG: Sinus rhythm with no significant ST or T wave changes.  \par \par 65-year-old man with past medical history of mild nonischemic cardiomyopathy, frequent PVCs, hypertension, diabetes who presents to me for follow-up.  Patient appears to be stable from a cardiac standpoint with no evidence of acute heart failure.  Blood pressures are well controlled.  He is still having frequent PVCs and Holter monitor again showed a PVC burden of over 100/h following the increase in metoprolol.  Given his history of erectile dysfunction I have some concerns with increasing it further but the metoprolol has not seems to affect that issue and I will increase it to twice daily to try and get his PVC burden down especially in the context of his cardiomyopathy.

## 2021-09-20 NOTE — HISTORY OF PRESENT ILLNESS
[FreeTextEntry1] : Patient presents back to the office today feeling better than last I saw him.  He feels that he has more energy as he has been more active and doing more things.  Unfortunately he has not been doing really any regular exercise and has not been careful with his diet and has managed to gain some weight.  He never followed up with urology regarding his erectile dysfunction.  He tolerated the increase in his medication otherwise without a problem.  Patient denies chest pain, shortness of breath, palpitations, orthopnea, presyncope, syncope.

## 2021-09-25 ENCOUNTER — RX RENEWAL (OUTPATIENT)
Age: 66
End: 2021-09-25

## 2021-10-04 ENCOUNTER — APPOINTMENT (OUTPATIENT)
Dept: INTERNAL MEDICINE | Facility: CLINIC | Age: 66
End: 2021-10-04

## 2021-11-30 ENCOUNTER — RX RENEWAL (OUTPATIENT)
Age: 66
End: 2021-11-30

## 2021-12-01 ENCOUNTER — RX RENEWAL (OUTPATIENT)
Age: 66
End: 2021-12-01

## 2021-12-01 RX ORDER — ROSUVASTATIN CALCIUM 10 MG/1
10 TABLET, FILM COATED ORAL
Qty: 90 | Refills: 1 | Status: ACTIVE | COMMUNITY
Start: 2021-01-15 | End: 1900-01-01

## 2021-12-09 ENCOUNTER — APPOINTMENT (OUTPATIENT)
Dept: CARDIOLOGY | Facility: CLINIC | Age: 66
End: 2021-12-09
Payer: COMMERCIAL

## 2021-12-09 PROCEDURE — ZZZZZ: CPT

## 2021-12-20 ENCOUNTER — APPOINTMENT (OUTPATIENT)
Dept: CARDIOLOGY | Facility: CLINIC | Age: 66
End: 2021-12-20
Payer: COMMERCIAL

## 2021-12-20 VITALS
SYSTOLIC BLOOD PRESSURE: 147 MMHG | BODY MASS INDEX: 31.7 KG/M2 | OXYGEN SATURATION: 98 % | WEIGHT: 214 LBS | HEART RATE: 65 BPM | RESPIRATION RATE: 16 BRPM | DIASTOLIC BLOOD PRESSURE: 88 MMHG | HEIGHT: 69 IN

## 2021-12-20 PROCEDURE — 99214 OFFICE O/P EST MOD 30 MIN: CPT

## 2021-12-20 NOTE — HISTORY OF PRESENT ILLNESS
[FreeTextEntry1] : Patient presents back to the office today feeling physically very well.  Unfortunately has continued to gain weight and is now up a total of 25 pounds this year.  He admits to not doing very much exercise but reports no symptoms in his daily activities or when he does any exertion.  He reports blood pressures at home in the 110s to 120s over 70s to 80s.  No new symptoms at this time.  Patient denies chest pain, shortness of breath, palpitations, orthopnea, presyncope, syncope.

## 2021-12-20 NOTE — ASSESSMENT
[FreeTextEntry1] : Holter monitor January 15, 2021 demonstrated presenting rhythm of sinus with average heart rate of 78 bpm, maximum 109, minimum 61 bpm. Frequent PVCs totaling 98 an hour with bigeminy and a total of 46 couplets were noted. Occasional PACs totaling 26 an hour with some bigeminy noted as well.\par \par Echocardiogram February 17, 2021 demonstrated left ventricle normal in size with mildly reduced function and an ejection fraction of 45 to 50%. Mild mitral valve prolapse with trace mitral regurgitation noted.\par \par Carotid Doppler February 17, 2021 showed mild plaque bilaterally and mild stenosis bilaterally.\par \par Nuclear stress test March 3, 2021 was a pharmacologic study which was tolerated well. Blood pressure sponsor was normal. EKG showed no evidence of ischemia. Evaluation of nuclear imaging showed no evidence of ischemia or infarct and ejection fraction of 53%.\par \par Holter monitor August 30, 2021 showed a presenting rhythm of sinus with an average heart rate of 67 bpm, maximum 86, minimum 55 bpm.  Frequent PVCs totaling 157/h with some bigeminy, trigeminy and couplets were noted.  No other significant arrhythmia.\par \par Holter monitor December 9, 2021 demonstrated presenting rhythm was sinus with an average heart rate of 74 bpm, maximum 105, minimum 60 bpm.  Frequent unifocal PVCs totaling 219/h were noted with some bigeminy, some trigeminy and a total of 39 couplets.  No evidence of VT.  Rare PACs noted.  No other significant arrhythmia.\par \par EKG: Sinus rhythm with no significant ST or T wave changes.  \par \par 65-year-old man with past medical history of mild nonischemic cardiomyopathy, frequent PVCs, hypertension, diabetes who presents to me for follow-up.  Patient appears to be stable from a cardiac standpoint with no evidence of acute heart failure.  Blood pressures are well controlled.  He is still having frequent PVCs, and in fact on the 24-hour Holter monitor they have actually increased since the increase in his metoprolol.  I would like to now have him do a longer monitor to fully assess his PVC burden.  We could continue to increase his metoprolol and it may be an option although given the unifocality and ablation could ultimately be an option if his burden is high, especially given his mildly depressed EF.  Blood pressure appears to be well controlled.  He is due for repeat blood work.

## 2022-04-04 ENCOUNTER — APPOINTMENT (OUTPATIENT)
Dept: CARDIOLOGY | Facility: CLINIC | Age: 67
End: 2022-04-04

## 2022-06-03 ENCOUNTER — RX RENEWAL (OUTPATIENT)
Age: 67
End: 2022-06-03

## 2022-06-21 ENCOUNTER — TRANSCRIPTION ENCOUNTER (OUTPATIENT)
Age: 67
End: 2022-06-21

## 2022-07-14 NOTE — PHYSICAL EXAM
operating room [General Appearance - In No Acute Distress] : no acute distress [Normal Conjunctiva] : the conjunctiva exhibited no abnormalities [Normal Oral Mucosa] : normal oral mucosa [Auscultation Breath Sounds / Voice Sounds] : lungs were clear to auscultation bilaterally [Normal Rate] : normal [Rhythm Regular] : regular [Normal S1] : normal S1 [Normal S2] : normal S2 [II] : a grade 2 [Abdomen Soft] : soft [Abdomen Tenderness] : non-tender [Abnormal Walk] : normal gait [Cyanosis, Localized] : no localized cyanosis [Nail Clubbing] : no clubbing of the fingernails [Oriented To Time, Place, And Person] : oriented to person, place, and time [Affect] : the affect was normal [FreeTextEntry1] : No JVD, no carotid bruits. [S3] : no S3 [S4] : no S4

## 2022-08-20 ENCOUNTER — EMERGENCY (EMERGENCY)
Facility: HOSPITAL | Age: 67
LOS: 0 days | Discharge: HOME | End: 2022-08-20
Attending: EMERGENCY MEDICINE
Payer: COMMERCIAL

## 2022-08-20 VITALS
DIASTOLIC BLOOD PRESSURE: 93 MMHG | HEART RATE: 89 BPM | SYSTOLIC BLOOD PRESSURE: 162 MMHG | RESPIRATION RATE: 18 BRPM | OXYGEN SATURATION: 99 %

## 2022-08-20 VITALS — HEIGHT: 70 IN | WEIGHT: 199.96 LBS
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
## 2022-08-20 DIAGNOSIS — T63.441A TOXIC EFFECT OF VENOM OF BEES, ACCIDENTAL (UNINTENTIONAL), INITIAL ENCOUNTER: ICD-10-CM

## 2022-08-20 DIAGNOSIS — E78.5 HYPERLIPIDEMIA, UNSPECIFIED: ICD-10-CM

## 2022-08-20 DIAGNOSIS — L50.9 URTICARIA, UNSPECIFIED: ICD-10-CM

## 2022-08-20 DIAGNOSIS — Z86.79 PERSONAL HISTORY OF OTHER DISEASES OF THE CIRCULATORY SYSTEM: ICD-10-CM

## 2022-08-20 DIAGNOSIS — I10 ESSENTIAL (PRIMARY) HYPERTENSION: ICD-10-CM

## 2022-08-20 DIAGNOSIS — E11.9 TYPE 2 DIABETES MELLITUS WITHOUT COMPLICATIONS: ICD-10-CM
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
## 2022-08-20 DIAGNOSIS — Z88.0 ALLERGY STATUS TO PENICILLIN: ICD-10-CM

## 2022-08-20 LAB
ALBUMIN SERPL ELPH-MCNC: 3.7 G/DL — SIGNIFICANT CHANGE UP (ref 3.3–5)
ALP SERPL-CCNC: 79 U/L — SIGNIFICANT CHANGE UP (ref 40–120)
ALT FLD-CCNC: 51 U/L — SIGNIFICANT CHANGE UP (ref 12–78)
ANION GAP SERPL CALC-SCNC: 9 MMOL/L — SIGNIFICANT CHANGE UP (ref 5–17)
AST SERPL-CCNC: 35 U/L — SIGNIFICANT CHANGE UP (ref 15–37)
BILIRUB SERPL-MCNC: 0.3 MG/DL — SIGNIFICANT CHANGE UP (ref 0.2–1.2)
BUN SERPL-MCNC: 14 MG/DL — SIGNIFICANT CHANGE UP (ref 7–23)
CALCIUM SERPL-MCNC: 9.7 MG/DL — SIGNIFICANT CHANGE UP (ref 8.5–10.1)
CHLORIDE SERPL-SCNC: 107 MMOL/L — SIGNIFICANT CHANGE UP (ref 96–108)
CO2 SERPL-SCNC: 20 MMOL/L — LOW (ref 22–31)
CREAT SERPL-MCNC: 1.29 MG/DL — SIGNIFICANT CHANGE UP (ref 0.5–1.3)
EGFR: 62 ML/MIN/1.73M2 — SIGNIFICANT CHANGE UP
GLUCOSE SERPL-MCNC: 366 MG/DL — HIGH (ref 70–99)
HCT VFR BLD CALC: 43.9 % — SIGNIFICANT CHANGE UP (ref 39–50)
HGB BLD-MCNC: 15.6 G/DL — SIGNIFICANT CHANGE UP (ref 13–17)
MCHC RBC-ENTMCNC: 30.4 PG — SIGNIFICANT CHANGE UP (ref 27–34)
MCHC RBC-ENTMCNC: 35.5 GM/DL — SIGNIFICANT CHANGE UP (ref 32–36)
MCV RBC AUTO: 85.4 FL — SIGNIFICANT CHANGE UP (ref 80–100)
PLATELET # BLD AUTO: 206 K/UL — SIGNIFICANT CHANGE UP (ref 150–400)
POTASSIUM SERPL-MCNC: 3.9 MMOL/L — SIGNIFICANT CHANGE UP (ref 3.5–5.3)
POTASSIUM SERPL-SCNC: 3.9 MMOL/L — SIGNIFICANT CHANGE UP (ref 3.5–5.3)
PROT SERPL-MCNC: 7.4 GM/DL — SIGNIFICANT CHANGE UP (ref 6–8.3)
RBC # BLD: 5.14 M/UL — SIGNIFICANT CHANGE UP (ref 4.2–5.8)
RBC # FLD: 13 % — SIGNIFICANT CHANGE UP (ref 10.3–14.5)
SODIUM SERPL-SCNC: 136 MMOL/L — SIGNIFICANT CHANGE UP (ref 135–145)
WBC # BLD: 7.16 K/UL — SIGNIFICANT CHANGE UP (ref 3.8–10.5)
WBC # FLD AUTO: 7.16 K/UL — SIGNIFICANT CHANGE UP (ref 3.8–10.5)

## 2022-08-20 PROCEDURE — 99284 EMERGENCY DEPT VISIT MOD MDM: CPT

## 2022-08-20 PROCEDURE — 85027 COMPLETE CBC AUTOMATED: CPT

## 2022-08-20 PROCEDURE — 96374 THER/PROPH/DIAG INJ IV PUSH: CPT

## 2022-08-20 PROCEDURE — 96375 TX/PRO/DX INJ NEW DRUG ADDON: CPT

## 2022-08-20 PROCEDURE — 99284 EMERGENCY DEPT VISIT MOD MDM: CPT | Mod: 25

## 2022-08-20 PROCEDURE — 36415 COLL VENOUS BLD VENIPUNCTURE: CPT

## 2022-08-20 PROCEDURE — 80053 COMPREHEN METABOLIC PANEL: CPT

## 2022-08-20 PROCEDURE — 93005 ELECTROCARDIOGRAM TRACING: CPT

## 2022-08-20 PROCEDURE — 93010 ELECTROCARDIOGRAM REPORT: CPT

## 2022-08-20 PROCEDURE — 82962 GLUCOSE BLOOD TEST: CPT

## 2022-08-20 RX ORDER — DIPHENHYDRAMINE HCL 50 MG
50 CAPSULE ORAL ONCE
Refills: 0 | Status: COMPLETED | OUTPATIENT
Start: 2022-08-20 | End: 2022-08-20

## 2022-08-20 RX ORDER — METOPROLOL TARTRATE 50 MG
0 TABLET ORAL
Qty: 0 | Refills: 0 | DISCHARGE

## 2022-08-20 RX ORDER — FAMOTIDINE 10 MG/ML
20 INJECTION INTRAVENOUS ONCE
Refills: 0 | Status: COMPLETED | OUTPATIENT
Start: 2022-08-20 | End: 2022-08-20

## 2022-08-20 RX ORDER — SODIUM CHLORIDE 9 MG/ML
1000 INJECTION INTRAMUSCULAR; INTRAVENOUS; SUBCUTANEOUS ONCE
Refills: 0 | Status: COMPLETED | OUTPATIENT
Start: 2022-08-20 | End: 2022-08-20

## 2022-08-20 RX ORDER — TELMISARTAN 20 MG/1
0 TABLET ORAL
Qty: 0 | Refills: 0 | DISCHARGE

## 2022-08-20 RX ORDER — FAMOTIDINE 10 MG/ML
1 INJECTION INTRAVENOUS
Qty: 14 | Refills: 0
Start: 2022-08-20 | End: 2022-08-26

## 2022-08-20 RX ORDER — ROSUVASTATIN CALCIUM 5 MG/1
0 TABLET ORAL
Qty: 0 | Refills: 0 | DISCHARGE

## 2022-08-20 RX ORDER — EPINEPHRINE 0.3 MG/.3ML
0.3 INJECTION INTRAMUSCULAR; SUBCUTANEOUS
Qty: 1 | Refills: 0
Start: 2022-08-20

## 2022-08-20 RX ORDER — EPINEPHRINE 0.3 MG/.3ML
0.3 INJECTION INTRAMUSCULAR; SUBCUTANEOUS ONCE
Refills: 0 | Status: DISCONTINUED | OUTPATIENT
Start: 2022-08-20 | End: 2022-08-20

## 2022-08-20 RX ORDER — METFORMIN HYDROCHLORIDE 850 MG/1
0 TABLET ORAL
Qty: 0 | Refills: 0 | DISCHARGE

## 2022-08-20 RX ORDER — METFORMIN HYDROCHLORIDE 850 MG/1
1 TABLET ORAL
Qty: 60 | Refills: 0
Start: 2022-08-20 | End: 2022-09-18

## 2022-08-20 RX ORDER — IPRATROPIUM/ALBUTEROL SULFATE 18-103MCG
3 AEROSOL WITH ADAPTER (GRAM) INHALATION ONCE
Refills: 0 | Status: DISCONTINUED | OUTPATIENT
Start: 2022-08-20 | End: 2022-08-20

## 2022-08-20 RX ADMIN — Medication 125 MILLIGRAM(S): at 13:32

## 2022-08-20 RX ADMIN — Medication 50 MILLIGRAM(S): at 13:20

## 2022-08-20 RX ADMIN — FAMOTIDINE 20 MILLIGRAM(S): 10 INJECTION INTRAVENOUS at 13:20

## 2022-08-20 RX ADMIN — SODIUM CHLORIDE 2000 MILLILITER(S): 9 INJECTION INTRAMUSCULAR; INTRAVENOUS; SUBCUTANEOUS at 13:28

## 2022-08-20 NOTE — ED ADULT NURSE NOTE - CHPI ED NUR SYMPTOMS NEG
no difficulty breathing/no difficulty swallowing/no nausea/no shortness of breath/no swelling of face, tongue/no vomiting

## 2022-08-20 NOTE — ED PROVIDER NOTE - PATIENT PORTAL LINK FT
You can access the FollowMyHealth Patient Portal offered by [facility] by registering at the following website: [email or web address]. By joining [employer]’s FollowMyHealth portal, you will also be able to view your health information using other applications (apps) compatible with our system.

## 2022-08-20 NOTE — ED PROVIDER NOTE - OBJECTIVE STATEMENT
65 y/o male with a PMhx of  NIDDM, HTN, HLD, PVC presents to the ED c/o allergic rxn. States he got stung by bee on L upper back. Notes similar hx  30 yrs ago. reports episode consists of redness and  hives from face to thorax and arms. No SOB, CP. Took 1 Benadryl PTA.

## 2022-08-20 NOTE — ED PROVIDER NOTE - PROGRESS NOTE DETAILS
On reeval, HR now normalized, rash has completely resolved. Pt sleepy from benadryl but awake and alert, reports feeling better. Still feels some tighness in upper lip but no obvious sdwelling, no tongue swelling, pharynx normal, voice normal. Stable for dc. Will rx epipen, 4 more days prednisone, pepcid, cont benadryl, as well as his usual metformin and pt understands to cont to monitor blood sugars at home. Strict ED precautions d/w pt and wife

## 2022-08-20 NOTE — ED ADULT TRIAGE NOTE - CHIEF COMPLAINT QUOTE
Pt. to the ED BIB Spouse for Allergic Reaction S/P Bee Sting x 1 Hour Ago. Pt. reports + Throat Swelling , Hives and Itching in Body- No respiratory distress at this time - Wife Reports giving 25 mg PO Benadryl PTA

## 2022-08-20 NOTE — ED PROVIDER NOTE - PHYSICAL EXAMINATION
General: AAOx3, NAD  HEENT: NCAT, no pharyngeal or tongue swelling voice normal,   Cardiac: Normal rate and rhythym, no murmurs, normal peripheral perfusion  Respiratory: Normal rate and effort. CTAB, no stridor or wheezing  GI: Soft, nondistended, nontender  Neuro: No focal deficits. XAVIER equally x4, sensation to light touch intact throughout  MSK: FROMx4, no focal bony tenderness, no peripheral edema  Skin: No rash  +diffuse redness of chest, neck and face up to forehead with urticaria

## 2022-08-20 NOTE — ED ADULT NURSE NOTE - OBJECTIVE STATEMENT
Pt. to the ED BIB Spouse for Allergic Reaction S/P Bee Sting x 1 Hour Ago. Pt. reports + Throat Swelling , Hives and Itching in Body- No respiratory distress at this time - Wife Reports giving 25 mg PO Benadryl PTA.pt face is red no difficulty speaking full sentences .PT is known diabetic didn't take his metformin for 2 weeks.

## 2022-08-20 NOTE — ED PROVIDER NOTE - NSICDXPASTMEDICALHX_GEN_ALL_CORE_FT
PAST MEDICAL HISTORY:  DM (diabetes mellitus)     HLD (hyperlipidemia)     HTN (hypertension)     PVC's (premature ventricular contractions)

## 2022-08-26 PROBLEM — E11.9 TYPE 2 DIABETES MELLITUS WITHOUT COMPLICATIONS: Chronic | Status: ACTIVE | Noted: 2022-08-20

## 2022-08-26 PROBLEM — I10 ESSENTIAL (PRIMARY) HYPERTENSION: Chronic | Status: ACTIVE | Noted: 2022-08-20

## 2022-08-26 PROBLEM — I49.3 VENTRICULAR PREMATURE DEPOLARIZATION: Chronic | Status: ACTIVE | Noted: 2022-08-20

## 2022-08-26 PROBLEM — E78.5 HYPERLIPIDEMIA, UNSPECIFIED: Chronic | Status: ACTIVE | Noted: 2022-08-20

## 2022-08-29 ENCOUNTER — NON-APPOINTMENT (OUTPATIENT)
Age: 67
End: 2022-08-29

## 2022-08-29 ENCOUNTER — APPOINTMENT (OUTPATIENT)
Dept: INTERNAL MEDICINE | Facility: CLINIC | Age: 67
End: 2022-08-29

## 2022-08-29 VITALS
HEIGHT: 68 IN | HEART RATE: 67 BPM | OXYGEN SATURATION: 99 % | WEIGHT: 208 LBS | RESPIRATION RATE: 16 BRPM | BODY MASS INDEX: 31.52 KG/M2 | SYSTOLIC BLOOD PRESSURE: 175 MMHG | TEMPERATURE: 97.7 F | DIASTOLIC BLOOD PRESSURE: 95 MMHG

## 2022-08-29 DIAGNOSIS — Z13.31 ENCOUNTER FOR SCREENING FOR DEPRESSION: ICD-10-CM

## 2022-08-29 DIAGNOSIS — Z78.9 OTHER SPECIFIED HEALTH STATUS: ICD-10-CM

## 2022-08-29 DIAGNOSIS — Z12.11 ENCOUNTER FOR SCREENING FOR MALIGNANT NEOPLASM OF COLON: ICD-10-CM

## 2022-08-29 DIAGNOSIS — Z00.00 ENCOUNTER FOR GENERAL ADULT MEDICAL EXAMINATION W/OUT ABNORMAL FINDINGS: ICD-10-CM

## 2022-08-29 DIAGNOSIS — F12.90 CANNABIS USE, UNSPECIFIED, UNCOMPLICATED: ICD-10-CM

## 2022-08-29 PROCEDURE — 36415 COLL VENOUS BLD VENIPUNCTURE: CPT

## 2022-08-29 PROCEDURE — 96127 BRIEF EMOTIONAL/BEHAV ASSMT: CPT

## 2022-08-29 PROCEDURE — 93000 ELECTROCARDIOGRAM COMPLETE: CPT

## 2022-08-29 PROCEDURE — 99397 PER PM REEVAL EST PAT 65+ YR: CPT | Mod: 25

## 2022-08-29 NOTE — ASSESSMENT
[FreeTextEntry1] : \par \par DM type 2:\par -HgA1c 6.5 2021\par -He was previously followed by Dr. Carroll\par -His recent glucose readings have been elevated and recent glucose on metabolic panel was 366\par -Check fasting labs today including hemoglobin A1c, lipids, urine A:C\par -I have advised that he increase his metformin metformin 500mg 2 tablets BID\par -on crestor-check lipids\par -Ophthalmology-will discuss at next visit\par -Foot exam: 2022\par -he should adhere to low fat/low cholesterol diet, low carbohydrate diet and weight loss advised\par \par Hypertension:\par -BP not at goal today\par -now on Metoprolol XL  and telmisartan 40mg daily\par -I advised low fat/low cholesterol diet, low salt diet, and weight loss\par -Continue metoprolol at current dose\par -I have advised that he increase his telmisartan to 80 mg daily\par -Follow-up in 4 weeks for BP check\par -He should continue to check home BP readings and notify office if readings remain elevated\par \par Obesity:\par -BMI 31.6\par -low fat/low chol diet and low carbohydrate diet advised\par -weight loss advised\par -Check fasting labs\par \par History of hypogonadism\par -Check testosterone\par \par Cardiomyopathy/PVCs/Abnormal EKG:\par -I have advised that he schedule follow-up appointment with his cardiologist Dr. Paniagua\par -EKG today NSR at 62 with no ST abnormalities\par -on telmisartan, Toprol XL, crestor\par -he is asymptomatic\par \par Mild carotid stenosis:\par -carotid US with cardiology less than 50% B/L\par -on statin\par -check fasting labs\par -f/u with cardiology\par \par \par HCM:\par \par CPE  2022\par \par Depression screenin2022 PHQ 2 score 0\par \par EKG 2022\par \par Flu shot: Advised 2022-he declined\par \par Tdap: Advised 2022-he declined\par \par Shingles vaccine: Advised 2022-he declined\par \par Pneumonia vaccines: advised 2022-he declined\par \par Covid vaccine: (Pfizer x3)\par \par HIV testing: offered 2022-he declined\par \par Hepatitis C screenin2021 negative\par \par Colonoscopy: Last colonoscopy was in -advised again today 2022-referred today\par \par FIT test: 2021 negative-ordered today 2022\par \par PSA: 0.98 2021-check PSA\par \par F/U 4 weeks for BP check.  Fasting labs ordered and drawn in office today\par

## 2022-08-29 NOTE — HISTORY OF PRESENT ILLNESS
[de-identified] : Here for CPE\par \par He reports he was in [facility] emergency room 8/20/2022 after he had a bee sting and allergic reaction.  He states in the emergency room blood work was taken and he was told that his sugar was very high.  On his metabolic panel his glucose was 366\par \par He states he had run out of his metformin but it was refilled by ER.  He states he has been checking glucose fingersticks at home and they have been in the mid 200s while he is on metformin 500 mg twice daily\par \par He also reports that his blood pressures have been running high at home as well.  He states he is taking his metoprolol and telmisartan as directed\par \par He denies any chest pain, shortness of breath, palpitations, dyspnea on exertion

## 2022-08-29 NOTE — PHYSICAL EXAM
[No Acute Distress] : no acute distress [Well Nourished] : well nourished [Well Developed] : well developed [Well-Appearing] : well-appearing [Normal Voice/Communication] : normal voice/communication [Normal Sclera/Conjunctiva] : normal sclera/conjunctiva [PERRL] : pupils equal round and reactive to light [Normal Oropharynx] : the oropharynx was normal [No JVD] : no jugular venous distention [No Lymphadenopathy] : no lymphadenopathy [Supple] : supple [Thyroid Normal, No Nodules] : the thyroid was normal and there were no nodules present [No Respiratory Distress] : no respiratory distress  [No Accessory Muscle Use] : no accessory muscle use [Clear to Auscultation] : lungs were clear to auscultation bilaterally [Normal Rate] : normal rate  [Regular Rhythm] : with a regular rhythm [Normal S1, S2] : normal S1 and S2 [No Murmur] : no murmur heard [No Carotid Bruits] : no carotid bruits [No Edema] : there was no peripheral edema [No Palpable Aorta] : no palpable aorta [No Extremity Clubbing/Cyanosis] : no extremity clubbing/cyanosis [Soft] : abdomen soft [Non Tender] : non-tender [Non-distended] : non-distended [No Masses] : no abdominal mass palpated [No HSM] : no HSM [Normal Bowel Sounds] : normal bowel sounds [No Rash] : no rash [No Focal Deficits] : no focal deficits [Normal Affect] : the affect was normal [Alert and Oriented x3] : oriented to person, place, and time [Normal Mood] : the mood was normal [Normal Insight/Judgement] : insight and judgment were intact [Comprehensive Foot Exam Normal] : Right and left foot were examined and both feet are normal. No ulcers in either foot. Toes are normal and with full ROM.  Normal tactile sensation with monofilament testing throughout both feet [EOMI] : extraocular movements intact [Normal Outer Ear/Nose] : the outer ears and nose were normal in appearance [Normal Nasal Mucosa] : the nasal mucosa was normal [de-identified] : right lateral strabismus [de-identified] : He has bilateral tympanostomy tubes [de-identified] : + 2 DP/PT pulses B/L

## 2022-08-29 NOTE — HEALTH RISK ASSESSMENT
[Never] : Never [No] : In the past 12 months have you used drugs other than those required for medical reasons? No [0] : 2) Feeling down, depressed, or hopeless: Not at all (0) [PHQ-2 Negative - No further assessment needed] : PHQ-2 Negative - No further assessment needed [HIV test declined] : HIV test declined [[id number]] : 0

## 2022-08-29 NOTE — REVIEW OF SYSTEMS
[Negative] : Neurological [Fever] : no fever [Chills] : no chills [Fatigue] : no fatigue [Chest Pain] : no chest pain [Palpitations] : no palpitations [Lower Ext Edema] : no lower extremity edema [Shortness Of Breath] : no shortness of breath [Wheezing] : no wheezing [Cough] : no cough [Dyspnea on Exertion] : not dyspnea on exertion [Abdominal Pain] : no abdominal pain [Nausea] : no nausea [Diarrhea] : no diarrhea [Vomiting] : no vomiting [Anxiety] : no anxiety [Depression] : no depression [FreeTextEntry2] : gained 10 lbs

## 2022-09-02 LAB
25(OH)D3 SERPL-MCNC: 22.2 NG/ML
ALBUMIN SERPL ELPH-MCNC: 4.3 G/DL
ALP BLD-CCNC: 67 U/L
ALT SERPL-CCNC: 31 U/L
ANION GAP SERPL CALC-SCNC: 14 MMOL/L
APPEARANCE: CLEAR
AST SERPL-CCNC: 20 U/L
BACTERIA: NEGATIVE
BASOPHILS # BLD AUTO: 0.06 K/UL
BASOPHILS NFR BLD AUTO: 0.9 %
BILIRUB SERPL-MCNC: 0.6 MG/DL
BILIRUBIN URINE: NEGATIVE
BLOOD URINE: NEGATIVE
BUN SERPL-MCNC: 14 MG/DL
CALCIUM SERPL-MCNC: 9.5 MG/DL
CHLORIDE SERPL-SCNC: 107 MMOL/L
CHOLEST SERPL-MCNC: 154 MG/DL
CO2 SERPL-SCNC: 19 MMOL/L
COLOR: YELLOW
CREAT SERPL-MCNC: 0.79 MG/DL
CREAT SPEC-SCNC: 147 MG/DL
EGFR: 99 ML/MIN/1.73M2
EOSINOPHIL # BLD AUTO: 0.14 K/UL
EOSINOPHIL NFR BLD AUTO: 2 %
ESTIMATED AVERAGE GLUCOSE: 232 MG/DL
GLUCOSE QUALITATIVE U: ABNORMAL
GLUCOSE SERPL-MCNC: 267 MG/DL
HBA1C MFR BLD HPLC: 9.7 %
HCT VFR BLD CALC: 46.2 %
HDLC SERPL-MCNC: 39 MG/DL
HGB BLD-MCNC: 15.9 G/DL
HYALINE CASTS: 0 /LPF
IMM GRANULOCYTES NFR BLD AUTO: 0.6 %
KETONES URINE: NEGATIVE
LDLC SERPL CALC-MCNC: NORMAL MG/DL
LEUKOCYTE ESTERASE URINE: NEGATIVE
LYMPHOCYTES # BLD AUTO: 2.02 K/UL
LYMPHOCYTES NFR BLD AUTO: 28.8 %
MAN DIFF?: NORMAL
MCHC RBC-ENTMCNC: 30.1 PG
MCHC RBC-ENTMCNC: 34.4 GM/DL
MCV RBC AUTO: 87.3 FL
MICROALBUMIN 24H UR DL<=1MG/L-MCNC: <1.2 MG/DL
MICROALBUMIN/CREAT 24H UR-RTO: NORMAL MG/G
MICROSCOPIC-UA: NORMAL
MONOCYTES # BLD AUTO: 0.41 K/UL
MONOCYTES NFR BLD AUTO: 5.8 %
NEUTROPHILS # BLD AUTO: 4.34 K/UL
NEUTROPHILS NFR BLD AUTO: 61.9 %
NITRITE URINE: NEGATIVE
NONHDLC SERPL-MCNC: 115 MG/DL
PH URINE: 5.5
PLATELET # BLD AUTO: NORMAL K/UL
POTASSIUM SERPL-SCNC: 4.2 MMOL/L
PROT SERPL-MCNC: 6.7 G/DL
PROTEIN URINE: NORMAL
PSA SERPL-MCNC: 1.18 NG/ML
RBC # BLD: 5.29 M/UL
RBC # FLD: 13.5 %
RED BLOOD CELLS URINE: 2 /HPF
SODIUM SERPL-SCNC: 139 MMOL/L
SPECIFIC GRAVITY URINE: 1.03
SQUAMOUS EPITHELIAL CELLS: 0 /HPF
T4 FREE SERPL-MCNC: 1.1 NG/DL
TESTOST FREE SERPL-MCNC: 3.9 PG/ML
TESTOST SERPL-MCNC: 180 NG/DL
TRIGL SERPL-MCNC: 455 MG/DL
TSH SERPL-ACNC: 1.73 UIU/ML
UROBILINOGEN URINE: NORMAL
WBC # FLD AUTO: 7.01 K/UL
WHITE BLOOD CELLS URINE: 1 /HPF

## 2022-09-08 ENCOUNTER — NON-APPOINTMENT (OUTPATIENT)
Age: 67
End: 2022-09-08

## 2022-09-09 ENCOUNTER — NON-APPOINTMENT (OUTPATIENT)
Age: 67
End: 2022-09-09

## 2022-10-04 ENCOUNTER — APPOINTMENT (OUTPATIENT)
Dept: INTERNAL MEDICINE | Facility: CLINIC | Age: 67
End: 2022-10-04

## 2022-10-04 VITALS
RESPIRATION RATE: 15 BRPM | SYSTOLIC BLOOD PRESSURE: 126 MMHG | WEIGHT: 203 LBS | HEART RATE: 82 BPM | BODY MASS INDEX: 30.77 KG/M2 | DIASTOLIC BLOOD PRESSURE: 72 MMHG | TEMPERATURE: 97.4 F | HEIGHT: 68 IN | OXYGEN SATURATION: 97 %

## 2022-10-04 DIAGNOSIS — E55.9 VITAMIN D DEFICIENCY, UNSPECIFIED: ICD-10-CM

## 2022-10-04 PROCEDURE — 82962 GLUCOSE BLOOD TEST: CPT | Mod: NC

## 2022-10-04 PROCEDURE — 99214 OFFICE O/P EST MOD 30 MIN: CPT | Mod: 25

## 2022-10-04 NOTE — PHYSICAL EXAM
[No Acute Distress] : no acute distress [Well Nourished] : well nourished [Well Developed] : well developed [Well-Appearing] : well-appearing [Normal Voice/Communication] : normal voice/communication [Normal Sclera/Conjunctiva] : normal sclera/conjunctiva [PERRL] : pupils equal round and reactive to light [EOMI] : extraocular movements intact [Normal Oropharynx] : the oropharynx was normal [Normal Nasal Mucosa] : the nasal mucosa was normal [No JVD] : no jugular venous distention [No Lymphadenopathy] : no lymphadenopathy [Supple] : supple [Thyroid Normal, No Nodules] : the thyroid was normal and there were no nodules present [No Respiratory Distress] : no respiratory distress  [No Accessory Muscle Use] : no accessory muscle use [Clear to Auscultation] : lungs were clear to auscultation bilaterally [Normal Rate] : normal rate  [Regular Rhythm] : with a regular rhythm [Normal S1, S2] : normal S1 and S2 [No Murmur] : no murmur heard [No Edema] : there was no peripheral edema [No Palpable Aorta] : no palpable aorta [No Extremity Clubbing/Cyanosis] : no extremity clubbing/cyanosis [Soft] : abdomen soft [Non Tender] : non-tender [Non-distended] : non-distended [No Masses] : no abdominal mass palpated [No HSM] : no HSM [Normal Bowel Sounds] : normal bowel sounds [No Rash] : no rash [Normal Affect] : the affect was normal [Alert and Oriented x3] : oriented to person, place, and time [Normal Mood] : the mood was normal [Normal Insight/Judgement] : insight and judgment were intact [de-identified] : right lateral strabismus

## 2022-10-04 NOTE — ASSESSMENT
[FreeTextEntry1] : \par \par DM type 2:\par -HgA1c 9.7 2022\par -glucose FS today 257\par -I have advised he make appt to see ENdcorine\par -now on metformin 500mg 2 tablets BID but FS still high\par -Tx options discussed inclduing januvia, jardiace, and glipizide (he has multiple family members with thyroid cancer)\par -will start glipizide XL 2.5mg daily  (R/B/A/side effects discussed)\par -will prescribe Freestyle dmitry sensor which he can use with smart phone-should bring to office for teaching\par -on crestor\par -Ophthalmology-I have advised he see ophtho for eye exam-referral given\par -Foot exam: 2022\par -he should adhere to low fat/low cholesterol diet, low carbohydrate diet and weight loss advised-I have adv he cut down on the bread he eats for breakfast\par -he has lost 5 lbs since last visit\par \par Dyslipidemia\par -2022 lipids chol 154, trig 455\par -on crestor 10mg daily\par -repeat fasting labs in 3 months\par \par Hypertension:\par -BP at goal on Metoprolol XL  and telmisartan 80mg daily\par -I advised low fat/low cholesterol diet, low salt diet, and weight loss\par \par Obesity:\par -low fat/low chol diet and low carbohydrate diet advised\par -weight loss advised\par \par Low testosterone\par -referred to Endocrine\par \par Cardiomyopathy/PVCs/Abnormal EKG:\par -I have advised that he schedule follow-up appointment with his cardiologist Dr. Paniagua\par -on telmisartan, Toprol XL, crestor\par -he is asymptomatic\par \par Mild carotid stenosis:\par -carotid US with cardiology less than 50% B/L\par -on statin\par -f/u with cardiology\par \par \par HCM:\par \par CPE  2022\par \par Depression screenin2022 PHQ 2 score 0\par \par EKG 2022\par \par Flu shot: Advised 10/4/2022-he declined\par \par Tdap: Advised 2022-he declined\par \par Shingles vaccine: Advised 2022-he declined\par \par Pneumonia vaccines: advised 2022-he declined\par \par Covid vaccine: (Pfizer x3)\par \par HIV testing: offered 2022-he declined\par \par Hepatitis C screenin2021 negative\par \par Colonoscopy: Last colonoscopy was in -advised again today 2022-referred again today 10/4/2022\par \par FIT test: 2021 negative-ordered 2022\par \par PSA: 1.18 2022\par \par F/U 3 months\par

## 2022-10-04 NOTE — HISTORY OF PRESENT ILLNESS
[de-identified] : Here today for follow up of DM and recent labs\par \par he stats he has increased his metformin to 500mg 2 tabs BID but states almost all glucose FS are above 200\par \par He states home BP readings range 110-130s/60-85\par \par He states his fingers hurt from doing glucose FS at home\par \par He eats bread for breakfast almost every day

## 2022-10-04 NOTE — REVIEW OF SYSTEMS
[Negative] : Integumentary [Fever] : no fever [Chills] : no chills [Fatigue] : no fatigue [Chest Pain] : no chest pain [Palpitations] : no palpitations [Lower Ext Edema] : no lower extremity edema [Shortness Of Breath] : no shortness of breath [Wheezing] : no wheezing [Cough] : no cough [Dyspnea on Exertion] : not dyspnea on exertion [Abdominal Pain] : no abdominal pain [Nausea] : no nausea [Diarrhea] : no diarrhea [Vomiting] : no vomiting

## 2022-10-19 ENCOUNTER — APPOINTMENT (OUTPATIENT)
Dept: INTERNAL MEDICINE | Facility: CLINIC | Age: 67
End: 2022-10-19

## 2022-11-22 ENCOUNTER — TRANSCRIPTION ENCOUNTER (OUTPATIENT)
Age: 67
End: 2022-11-22

## 2022-12-06 ENCOUNTER — RX RENEWAL (OUTPATIENT)
Age: 67
End: 2022-12-06

## 2022-12-06 ENCOUNTER — APPOINTMENT (OUTPATIENT)
Dept: CARDIOLOGY | Facility: CLINIC | Age: 67
End: 2022-12-06

## 2022-12-06 VITALS
SYSTOLIC BLOOD PRESSURE: 131 MMHG | DIASTOLIC BLOOD PRESSURE: 84 MMHG | OXYGEN SATURATION: 100 % | BODY MASS INDEX: 29.1 KG/M2 | RESPIRATION RATE: 16 BRPM | HEIGHT: 68 IN | HEART RATE: 76 BPM | WEIGHT: 192 LBS

## 2022-12-06 PROCEDURE — 93000 ELECTROCARDIOGRAM COMPLETE: CPT

## 2022-12-06 PROCEDURE — 99214 OFFICE O/P EST MOD 30 MIN: CPT | Mod: 25

## 2022-12-06 NOTE — HISTORY OF PRESENT ILLNESS
[FreeTextEntry1] : Patient presents the office today having not been seen in a year noting that in September he has been having shortness of breath on exertion.  It has been slowly progressing but now he notices it with even relatively low levels of exertion.  There is no associated chest pain or any other symptoms.  He does notice however that separately from that he gets dizziness on standing sometimes if he gets up quickly.  He needs a secondary to to gain his balance when that happens.  He does state that he drinks a fair amount of lemonade for hydration although not very much.  Blood pressures have been from 110s to 120s over 60s to 70s although occasionally it has been lower and even drop below 100 systolic.  He does note that he ran out of his metoprolol a couple of months ago and never got a refill.  Patient denies chest pain, palpitations, orthopnea, presyncope, syncope.

## 2022-12-06 NOTE — ASSESSMENT
[FreeTextEntry1] : Holter monitor January 15, 2021 demonstrated presenting rhythm of sinus with average heart rate of 78 bpm, maximum 109, minimum 61 bpm. Frequent PVCs totaling 98 an hour with bigeminy and a total of 46 couplets were noted. Occasional PACs totaling 26 an hour with some bigeminy noted as well.\par \par Echocardiogram February 17, 2021 demonstrated left ventricle normal in size with mildly reduced function and an ejection fraction of 45 to 50%. Mild mitral valve prolapse with trace mitral regurgitation noted.\par \par Carotid Doppler February 17, 2021 showed mild plaque bilaterally and mild stenosis bilaterally.\par \par Nuclear stress test March 3, 2021 was a pharmacologic study which was tolerated well. Blood pressure sponsor was normal. EKG showed no evidence of ischemia. Evaluation of nuclear imaging showed no evidence of ischemia or infarct and ejection fraction of 53%.\par \par Holter monitor August 30, 2021 showed a presenting rhythm of sinus with an average heart rate of 67 bpm, maximum 86, minimum 55 bpm.  Frequent PVCs totaling 157/h with some bigeminy, trigeminy and couplets were noted.  No other significant arrhythmia.\par \par Event monitor January 10 January 23, 2022 showed a presenting rhythm of sinus with an average heart rate of 69 bpm, max 120, minimum 50 bpm.  PVC burden of 8% was noted.  Rare PACs noted.  No other significant arrhythmia.\par \par EKG: Sinus rhythm with no significant ST or T wave changes.  \par \par 65-year-old man with past medical history of mild nonischemic cardiomyopathy, frequent PVCs, hypertension, diabetes who presents to me for follow-up.  Patient presents back having not been seen for the last year.  He returns having had an event monitor in January that we never discussed which showed a PVC burden of 8%.  He has been off his metoprolol for the last couple of months because he ran out and never got a refill.  He is also not come back to see me.  He does note some more shortness of breath on exertion over the last several months.  I have recommended further testing to rule out a cardiovascular cause.  He does not show evidence of heart failure on exam here today.  We will certainly need to rule out any ischemia.  I will restart his metoprolol today given his PVCs and cardiomyopathy.  If his blood pressure start to get too low or he has more significant dizziness we may have to lower his telmisartan.

## 2022-12-06 NOTE — DISCUSSION/SUMMARY
[FreeTextEntry1] : 1.  Check echocardiogram nuclear stress test given his shortness of breath on exertion and to reevaluate his cardiomyopathy.\par 2. Restart metoprolol ER at 50 mg daily for his cardiomyopathy and PVCs.\par 3. 2-week event monitor after restarting his metoprolol to reevaluate his PVC burden.\par 4. Continue other current cardiac meds in doses as noted above for cardiomyopathy, diabetes, dyslipidemia and hypertension.  May need to lower his telmisartan if his blood pressure gets too low or he has more dizziness.\par 5. Monitor BP at home, keep a log and bring to f/u.\par 6. Consider EP evaluation and possible ablation if his PVC burden is high and he continues to have a low EF.\par 7. He will follow-up with his PCP for treatment of his diabetes. \par 8. Patient encouraged to work on diet to lose weight.\par 9. Patient encouraged to work on a healthy diet high in lean protein, whole grains and vegetables, and lower in white flour and simple sugars.  \par 10. Patient is encouraged to exercise at least 30 minutes a day everyday of the week.\par 11. Follow up here in two months. [EKG obtained to assist in diagnosis and management of assessed problem(s)] : EKG obtained to assist in diagnosis and management of assessed problem(s)

## 2022-12-15 LAB
ALBUMIN SERPL ELPH-MCNC: 4.7 G/DL
ALP BLD-CCNC: 57 U/L
ALT SERPL-CCNC: 25 U/L
ANION GAP SERPL CALC-SCNC: 11 MMOL/L
AST SERPL-CCNC: 24 U/L
BASOPHILS # BLD AUTO: 0.06 K/UL
BASOPHILS NFR BLD AUTO: 0.9 %
BILIRUB SERPL-MCNC: 0.5 MG/DL
BUN SERPL-MCNC: 25 MG/DL
CALCIUM SERPL-MCNC: 9.4 MG/DL
CHLORIDE SERPL-SCNC: 105 MMOL/L
CHOLEST SERPL-MCNC: 152 MG/DL
CO2 SERPL-SCNC: 23 MMOL/L
CREAT SERPL-MCNC: 1.16 MG/DL
EGFR: 69 ML/MIN/1.73M2
EOSINOPHIL # BLD AUTO: 0.13 K/UL
EOSINOPHIL NFR BLD AUTO: 1.9 %
ESTIMATED AVERAGE GLUCOSE: 128 MG/DL
GLUCOSE SERPL-MCNC: 150 MG/DL
HBA1C MFR BLD HPLC: 6.1 %
HCT VFR BLD CALC: 41.8 %
HDLC SERPL-MCNC: 34 MG/DL
HGB BLD-MCNC: 13.7 G/DL
IMM GRANULOCYTES NFR BLD AUTO: 0.3 %
LDLC SERPL CALC-MCNC: 75 MG/DL
LYMPHOCYTES # BLD AUTO: 2.05 K/UL
LYMPHOCYTES NFR BLD AUTO: 30.6 %
MAGNESIUM SERPL-MCNC: 1.9 MG/DL
MAN DIFF?: NORMAL
MCHC RBC-ENTMCNC: 29.7 PG
MCHC RBC-ENTMCNC: 32.8 GM/DL
MCV RBC AUTO: 90.7 FL
MONOCYTES # BLD AUTO: 0.39 K/UL
MONOCYTES NFR BLD AUTO: 5.8 %
NEUTROPHILS # BLD AUTO: 4.06 K/UL
NEUTROPHILS NFR BLD AUTO: 60.5 %
NONHDLC SERPL-MCNC: 118 MG/DL
NT-PROBNP SERPL-MCNC: 102 PG/ML
PLATELET # BLD AUTO: 192 K/UL
POTASSIUM SERPL-SCNC: 5.2 MMOL/L
PROT SERPL-MCNC: 7 G/DL
RBC # BLD: 4.61 M/UL
RBC # FLD: 13.2 %
SODIUM SERPL-SCNC: 139 MMOL/L
TRIGL SERPL-MCNC: 212 MG/DL
WBC # FLD AUTO: 6.71 K/UL

## 2022-12-20 ENCOUNTER — APPOINTMENT (OUTPATIENT)
Dept: CARDIOLOGY | Facility: CLINIC | Age: 67
End: 2022-12-20

## 2022-12-20 PROCEDURE — 93306 TTE W/DOPPLER COMPLETE: CPT

## 2023-01-05 ENCOUNTER — APPOINTMENT (OUTPATIENT)
Dept: INTERNAL MEDICINE | Facility: CLINIC | Age: 68
End: 2023-01-05
Payer: COMMERCIAL

## 2023-01-05 VITALS
DIASTOLIC BLOOD PRESSURE: 70 MMHG | HEART RATE: 64 BPM | RESPIRATION RATE: 16 BRPM | SYSTOLIC BLOOD PRESSURE: 130 MMHG | OXYGEN SATURATION: 95 % | WEIGHT: 195 LBS | BODY MASS INDEX: 29.55 KG/M2 | HEIGHT: 68 IN | TEMPERATURE: 97.4 F

## 2023-01-05 DIAGNOSIS — E66.9 OBESITY, UNSPECIFIED: ICD-10-CM

## 2023-01-05 PROCEDURE — 99214 OFFICE O/P EST MOD 30 MIN: CPT

## 2023-01-05 RX ORDER — GLIPIZIDE 2.5 MG/1
2.5 TABLET, FILM COATED, EXTENDED RELEASE ORAL DAILY
Qty: 90 | Refills: 0 | Status: DISCONTINUED | COMMUNITY
Start: 2022-10-04 | End: 2023-01-05

## 2023-01-05 NOTE — HISTORY OF PRESENT ILLNESS
[de-identified] : Here for follow up of DM, cardiomyopathy, dyslipidemia, and low testosterone\par \par He states he feels well\par \par he states he has noted that he has had low blood sugars at night into the 40s and 50s.  He denies sx when this occurs.  he states he had reduced carb intake significantly

## 2023-01-05 NOTE — PHYSICAL EXAM
[No Acute Distress] : no acute distress [Well Nourished] : well nourished [Well Developed] : well developed [Well-Appearing] : well-appearing [Normal Voice/Communication] : normal voice/communication [Normal Sclera/Conjunctiva] : normal sclera/conjunctiva [PERRL] : pupils equal round and reactive to light [Normal Oropharynx] : the oropharynx was normal [Normal Nasal Mucosa] : the nasal mucosa was normal [No JVD] : no jugular venous distention [No Lymphadenopathy] : no lymphadenopathy [Supple] : supple [Thyroid Normal, No Nodules] : the thyroid was normal and there were no nodules present [No Respiratory Distress] : no respiratory distress  [No Accessory Muscle Use] : no accessory muscle use [Clear to Auscultation] : lungs were clear to auscultation bilaterally [Normal Rate] : normal rate  [Regular Rhythm] : with a regular rhythm [Normal S1, S2] : normal S1 and S2 [No Murmur] : no murmur heard [No Edema] : there was no peripheral edema [No Palpable Aorta] : no palpable aorta [No Extremity Clubbing/Cyanosis] : no extremity clubbing/cyanosis [Soft] : abdomen soft [Non Tender] : non-tender [Non-distended] : non-distended [No Masses] : no abdominal mass palpated [No HSM] : no HSM [Normal Bowel Sounds] : normal bowel sounds [No Rash] : no rash [Normal Affect] : the affect was normal [Alert and Oriented x3] : oriented to person, place, and time [Normal Mood] : the mood was normal [Normal Insight/Judgement] : insight and judgment were intact [de-identified] : right lateral strabismus

## 2023-01-05 NOTE — ASSESSMENT
[FreeTextEntry1] : \par \par DM type 2:\par -HgA1c 6.1 2022\par -he was previously referred to endocrine-referred again today\par -hypoglycemia reported by pt\par -currently on metformin 500mg 2 tablets BID and glipizide XL 2.5mg daily \par -given hypoglycemia will d/c glipizide\par -he should notify office if he has any further hypoglycemia\par -Ophthalmology-2022\par -Foot exam: 2022\par -he should adhere to low fat/low cholesterol diet, low carbohydrate diet and weight loss advised\par \par Dyslipidemia\par -2022 lipids chol 152, trig 212, LDL 75\par -on crestor 10mg daily\par -repeat fasting labs in 3 months\par \par Hypertension:\par -BP at goal on Metoprolol XL  and telmisartan 80mg daily\par -I advised low fat/low cholesterol diet, low salt diet, and weight loss\par \par Obesity:\par -BMI 29\par -he lost 19lbs in past year\par -low fat/low chol diet and low carbohydrate diet advised\par -weight loss advised\par \par Low testosterone\par -referred to Endocrine again today\par \par Cardiomyopathy/PVCs/Abnormal EKG/COLLINS:\par -seen by cardiologist Dr. Paniagua 2022 and stress test, ECHO and 2 week monitor ordered\par -on telmisartan, Toprol XL, crestor\par -he states he was previously having COLLINS but this has resolved\par -he is asymptomatic currently\par \par Mild carotid stenosis:\par -carotid US with cardiology less than 50% B/L\par -on statin\par \par \par HCM:\par \par CPE  2022\par \par Depression screenin2022 PHQ 2 score 0\par \par EKG 2022\par \par Flu shot: Advised 2023-he declined\par \par Tdap: Advised 2022-he declined\par \par Shingles vaccine: Advised 2022-he declined\par \par Pneumonia vaccines: advised 2022-he declined\par \par Covid vaccine: (Pfizer x3)-advised bivalent vaccine\par \par HIV testing: offered 2022-he declined\par \par Hepatitis C screenin2021 negative\par \par Colonoscopy: Last colonoscopy was in -advised again today 2022-referred again today 2022\par \par FIT test: 2021 negative-ordered 2023\par \par PSA: 1.18 2022\par \par F/U 3 months for fasting labs\par

## 2023-01-06 ENCOUNTER — MED ADMIN CHARGE (OUTPATIENT)
Age: 68
End: 2023-01-06

## 2023-01-06 ENCOUNTER — APPOINTMENT (OUTPATIENT)
Dept: CARDIOLOGY | Facility: CLINIC | Age: 68
End: 2023-01-06
Payer: COMMERCIAL

## 2023-01-06 PROCEDURE — 78452 HT MUSCLE IMAGE SPECT MULT: CPT

## 2023-01-06 PROCEDURE — 93015 CV STRESS TEST SUPVJ I&R: CPT

## 2023-01-06 PROCEDURE — A9500: CPT

## 2023-01-06 RX ORDER — REGADENOSON 0.08 MG/ML
0.4 INJECTION, SOLUTION INTRAVENOUS
Qty: 4 | Refills: 0 | Status: COMPLETED | OUTPATIENT
Start: 2023-01-06

## 2023-01-12 ENCOUNTER — APPOINTMENT (OUTPATIENT)
Dept: CARDIOLOGY | Facility: CLINIC | Age: 68
End: 2023-01-12
Payer: COMMERCIAL

## 2023-01-12 VITALS
OXYGEN SATURATION: 100 % | WEIGHT: 195 LBS | HEIGHT: 68 IN | RESPIRATION RATE: 16 BRPM | DIASTOLIC BLOOD PRESSURE: 82 MMHG | BODY MASS INDEX: 29.55 KG/M2 | SYSTOLIC BLOOD PRESSURE: 138 MMHG | HEART RATE: 57 BPM

## 2023-01-12 DIAGNOSIS — R06.00 DYSPNEA, UNSPECIFIED: ICD-10-CM

## 2023-01-12 PROCEDURE — 99213 OFFICE O/P EST LOW 20 MIN: CPT

## 2023-01-12 NOTE — ASSESSMENT
[FreeTextEntry1] : Holter monitor January 15, 2021 demonstrated presenting rhythm of sinus with average heart rate of 78 bpm, maximum 109, minimum 61 bpm. Frequent PVCs totaling 98 an hour with bigeminy and a total of 46 couplets were noted. Occasional PACs totaling 26 an hour with some bigeminy noted as well.\par \par Echocardiogram February 17, 2021 demonstrated left ventricle normal in size with mildly reduced function and an ejection fraction of 45 to 50%. Mild mitral valve prolapse with trace mitral regurgitation noted.\par \par Carotid Doppler February 17, 2021 showed mild plaque bilaterally and mild stenosis bilaterally.\par \par Nuclear stress test March 3, 2021 was a pharmacologic study which was tolerated well. Blood pressure sponsor was normal. EKG showed no evidence of ischemia. Evaluation of nuclear imaging showed no evidence of ischemia or infarct and ejection fraction of 53%.\par \par Holter monitor August 30, 2021 showed a presenting rhythm of sinus with an average heart rate of 67 bpm, maximum 86, minimum 55 bpm.  Frequent PVCs totaling 157/h with some bigeminy, trigeminy and couplets were noted.  No other significant arrhythmia.\par \par Event monitor January 10 January 23, 2022 showed a presenting rhythm of sinus with an average heart rate of 69 bpm, max 120, minimum 50 bpm.  PVC burden of 8% was noted.  Rare PACs noted.  No other significant arrhythmia.\par \par Event monitor December 14 of December 27, 2022 showed a presenting rhythm of sinus with an average heart rate of 62 bpm, maximum 120, minimum 50 bpm.  Rare PACs and PVCs noted.  PVC burden was 1%.  No significant arrhythmia.\par \par Echocardiogram December 20, 2022 demonstrated left ventricle normal size and function with ejection fraction of 55 to 60%.  Borderline dilated left atrium.  Mild mitral valve prolapse with mild mitral regurgitation.  Mild tricuspid regurgitation with mild to moderate pulmonic insufficiency.\par \par Nuclear stress test January 6, 2023 was a pharmacologic study which was tolerated well.  Blood pressure response to infusion was normal.  EKG showed no evidence of ischemia.  Evaluation of nuclear imaging showed no evidence of ischemia or infarct and a low normal ejection fraction of 50%.\par \par 67-year-old man with past medical history of mild nonischemic cardiomyopathy, frequent PVCs, hypertension, diabetes who presents to me for follow-up.  Patient presents back feeling much better with really no shortness of breath on exertion at this time since restarting metoprolol.  Cardiac testing shows that his ejection fraction is actually improved to normal.  He diabetes her burden has dropped from 8% to 1% and there is no arrhythmia.  Stress testing shows no evidence of ischemia.  At this time he is doing well with no evidence of heart failure.  No need for any additional changes to medication.  Blood pressure appears to be controlled at home.  Recent blood work shows improvement in his triglycerides and significant improvement in his A1c.  Lipids are now adequately controlled.

## 2023-01-12 NOTE — HISTORY OF PRESENT ILLNESS
[FreeTextEntry1] : Patient presents back to the office today feeling better.  He has been taking metoprolol and since then noticing improvement in his shortness of breath.  He is not really having shortness of breath on exertion at this time.  He reports blood pressure at home in the 120s over 70s.  He reports no other new symptoms at this time.  Patient denies chest pain, palpitations, orthopnea, presyncope, syncope.

## 2023-01-12 NOTE — DISCUSSION/SUMMARY
[FreeTextEntry1] : 1.  No additional cardiac testing at this time.\par 2. Continue other current cardiac meds in doses as noted above for cardiomyopathy, diabetes, dyslipidemia and hypertension.  \par 3. Monitor BP at home, keep a log and bring to f/u.\par 4. He will follow-up with his PCP for treatment of his diabetes. \par 5. Patient encouraged to work on diet to lose weight.\par 6. Patient encouraged to work on a healthy diet high in lean protein, whole grains and vegetables, and lower in white flour and simple sugars.  \par 7. Patient is encouraged to exercise at least 30 minutes a day everyday of the week.\par 8. Follow up here in 4 months.

## 2023-02-17 ENCOUNTER — NON-APPOINTMENT (OUTPATIENT)
Age: 68
End: 2023-02-17

## 2023-03-22 RX ORDER — KIT FOR THE PREPARATION OF TECHNETIUM TC99M SESTAMIBI 1 MG/5ML
INJECTION, POWDER, LYOPHILIZED, FOR SOLUTION PARENTERAL
Refills: 0 | Status: COMPLETED | OUTPATIENT
Start: 2023-03-22

## 2023-03-22 RX ADMIN — KIT FOR THE PREPARATION OF TECHNETIUM TC99M SESTAMIBI 0: 1 INJECTION, POWDER, LYOPHILIZED, FOR SOLUTION PARENTERAL at 00:00

## 2023-04-17 ENCOUNTER — APPOINTMENT (OUTPATIENT)
Dept: INTERNAL MEDICINE | Facility: CLINIC | Age: 68
End: 2023-04-17

## 2023-05-08 ENCOUNTER — APPOINTMENT (OUTPATIENT)
Dept: CARDIOLOGY | Facility: CLINIC | Age: 68
End: 2023-05-08
Payer: COMMERCIAL

## 2023-05-08 ENCOUNTER — NON-APPOINTMENT (OUTPATIENT)
Age: 68
End: 2023-05-08

## 2023-05-08 VITALS
SYSTOLIC BLOOD PRESSURE: 151 MMHG | RESPIRATION RATE: 16 BRPM | DIASTOLIC BLOOD PRESSURE: 84 MMHG | OXYGEN SATURATION: 97 % | HEIGHT: 68 IN | HEART RATE: 60 BPM | BODY MASS INDEX: 30.01 KG/M2 | WEIGHT: 198 LBS

## 2023-05-08 VITALS — SYSTOLIC BLOOD PRESSURE: 144 MMHG | DIASTOLIC BLOOD PRESSURE: 76 MMHG

## 2023-05-08 PROCEDURE — 93000 ELECTROCARDIOGRAM COMPLETE: CPT

## 2023-05-08 PROCEDURE — 99214 OFFICE O/P EST MOD 30 MIN: CPT | Mod: 25

## 2023-05-08 NOTE — HISTORY OF PRESENT ILLNESS
[FreeTextEntry1] : Patient presents back to the office today noting that over the last week or so his blood pressures have been higher.  They had previously been ranging in the 120s to 130s systolic but more recently have been in the 140s to 150s over 80s.  Nothing that has changed in his life is that his mother-in-law moved into his house about 3 weeks ago.  He reports no symptoms at all.  He continues to take the same medication without change.  He reports no significant dietary changes or excessive salt use and only went out to eat once recently.  Patient denies chest pain, shortness of breath, palpitations, orthopnea, presyncope, syncope.

## 2023-05-08 NOTE — ASSESSMENT
[FreeTextEntry1] : Holter monitor January 15, 2021 demonstrated presenting rhythm of sinus with average heart rate of 78 bpm, maximum 109, minimum 61 bpm. Frequent PVCs totaling 98 an hour with bigeminy and a total of 46 couplets were noted. Occasional PACs totaling 26 an hour with some bigeminy noted as well.\par \par Echocardiogram February 17, 2021 demonstrated left ventricle normal in size with mildly reduced function and an ejection fraction of 45 to 50%. Mild mitral valve prolapse with trace mitral regurgitation noted.\par \par Carotid Doppler February 17, 2021 showed mild plaque bilaterally and mild stenosis bilaterally.\par \par Nuclear stress test March 3, 2021 was a pharmacologic study which was tolerated well. Blood pressure sponsor was normal. EKG showed no evidence of ischemia. Evaluation of nuclear imaging showed no evidence of ischemia or infarct and ejection fraction of 53%.\par \par Holter monitor August 30, 2021 showed a presenting rhythm of sinus with an average heart rate of 67 bpm, maximum 86, minimum 55 bpm.  Frequent PVCs totaling 157/h with some bigeminy, trigeminy and couplets were noted.  No other significant arrhythmia.\par \par Event monitor January 10 January 23, 2022 showed a presenting rhythm of sinus with an average heart rate of 69 bpm, max 120, minimum 50 bpm.  PVC burden of 8% was noted.  Rare PACs noted.  No other significant arrhythmia.\par \par Event monitor December 14 of December 27, 2022 showed a presenting rhythm of sinus with an average heart rate of 62 bpm, maximum 120, minimum 50 bpm.  Rare PACs and PVCs noted.  PVC burden was 1%.  No significant arrhythmia.\par \par Echocardiogram December 20, 2022 demonstrated left ventricle normal size and function with ejection fraction of 55 to 60%.  Borderline dilated left atrium.  Mild mitral valve prolapse with mild mitral regurgitation.  Mild tricuspid regurgitation with mild to moderate pulmonic insufficiency.\par \par Nuclear stress test January 6, 2023 was a pharmacologic study which was tolerated well.  Blood pressure response to infusion was normal.  EKG showed no evidence of ischemia.  Evaluation of nuclear imaging showed no evidence of ischemia or infarct and a low normal ejection fraction of 50%.\par \par EKG: Sinus rhythm with no significant ST or T wave changes.  First-degree AV block.\par \par 67-year-old man with past medical history of mild nonischemic cardiomyopathy, frequent PVCs, hypertension, diabetes who presents to me for follow-up.  Patient continues to feel well however blood pressures have been more elevated over the last week.  Is unclear if this is related to the stress of his mother-in-law moving in with him or possibly even some salt loading.  I will hold off on making any changes to his medication for now and await a longer list of his blood pressures.  I will have him come back in 6 weeks to recheck his machine and evaluate his blood pressures at that time and consider changes to his medication.  EKG today is unremarkable just a first-degree AV block and no PVCs.  His blood pressure is a little bit higher on the right arm and have encouraged him to use that arm to check it.

## 2023-05-08 NOTE — DISCUSSION/SUMMARY
[FreeTextEntry1] : 1. No additional cardiac testing at this time.\par 2. Continue other current cardiac meds in doses as noted above for cardiomyopathy, diabetes, dyslipidemia and hypertension.  \par 3. Monitor BP at home, keep a log and bring to f/u.\par 4. He will follow-up with his PCP for treatment of his diabetes.  Continue current regimen.\par 5. Patient encouraged to work on diet to lose weight.\par 6. Patient encouraged to work on a healthy diet high in lean protein, whole grains and vegetables, and lower in white flour and simple sugars.  \par 7. Patient is encouraged to exercise at least 30 minutes a day everyday of the week.\par 8. Patient will return in 6 weeks for blood pressure check and we will consider changes to his medication at that time.  Follow-up appointment with me will be in 3 months. [EKG obtained to assist in diagnosis and management of assessed problem(s)] : EKG obtained to assist in diagnosis and management of assessed problem(s)

## 2023-05-08 NOTE — PHYSICAL EXAM
[General Appearance - In No Acute Distress] : no acute distress [Normal Conjunctiva] : the conjunctiva exhibited no abnormalities [Normal Oral Mucosa] : normal oral mucosa [Auscultation Breath Sounds / Voice Sounds] : lungs were clear to auscultation bilaterally [Normal Rate] : normal [Rhythm Regular] : regular [Normal S1] : normal S1 [Normal S2] : normal S2 [II] : a grade 2 [Abdomen Soft] : soft [Abnormal Walk] : normal gait [Abdomen Tenderness] : non-tender [Nail Clubbing] : no clubbing of the fingernails [Cyanosis, Localized] : no localized cyanosis [Oriented To Time, Place, And Person] : oriented to person, place, and time [Affect] : the affect was normal [FreeTextEntry1] : No JVD, no carotid bruits. [S3] : no S3 [S4] : no S4

## 2023-05-16 ENCOUNTER — APPOINTMENT (OUTPATIENT)
Dept: ENDOCRINOLOGY | Facility: CLINIC | Age: 68
End: 2023-05-16
Payer: COMMERCIAL

## 2023-05-16 VITALS
DIASTOLIC BLOOD PRESSURE: 77 MMHG | HEART RATE: 60 BPM | TEMPERATURE: 97.7 F | SYSTOLIC BLOOD PRESSURE: 129 MMHG | HEIGHT: 68 IN | BODY MASS INDEX: 29.7 KG/M2 | RESPIRATION RATE: 15 BRPM | OXYGEN SATURATION: 99 % | WEIGHT: 196 LBS

## 2023-05-16 DIAGNOSIS — E29.1 TESTICULAR HYPOFUNCTION: ICD-10-CM

## 2023-05-16 DIAGNOSIS — E11.9 TYPE 2 DIABETES MELLITUS W/OUT COMPLICATIONS: ICD-10-CM

## 2023-05-16 DIAGNOSIS — E04.9 NONTOXIC GOITER, UNSPECIFIED: ICD-10-CM

## 2023-05-16 DIAGNOSIS — Z80.8 FAMILY HISTORY OF MALIGNANT NEOPLASM OF OTHER ORGANS OR SYSTEMS: ICD-10-CM

## 2023-05-16 LAB — HBA1C MFR BLD HPLC: 6.2

## 2023-05-16 PROCEDURE — 99215 OFFICE O/P EST HI 40 MIN: CPT | Mod: 25

## 2023-05-16 PROCEDURE — 83036 HEMOGLOBIN GLYCOSYLATED A1C: CPT | Mod: QW

## 2023-05-16 RX ORDER — METFORMIN ER 500 MG 500 MG/1
500 TABLET ORAL TWICE DAILY
Qty: 4 | Refills: 1 | Status: ACTIVE | COMMUNITY
Start: 2023-05-16 | End: 1900-01-01

## 2023-05-17 RX ORDER — FLASH GLUCOSE SENSOR
KIT MISCELLANEOUS
Qty: 6 | Refills: 3 | Status: ACTIVE | COMMUNITY
Start: 2022-10-04 | End: 1900-01-01

## 2023-06-22 ENCOUNTER — APPOINTMENT (OUTPATIENT)
Dept: CARDIOLOGY | Facility: CLINIC | Age: 68
End: 2023-06-22
Payer: COMMERCIAL

## 2023-06-22 PROCEDURE — ZZZZZ: CPT

## 2023-09-12 ENCOUNTER — TRANSCRIPTION ENCOUNTER (OUTPATIENT)
Age: 68
End: 2023-09-12

## 2023-09-13 ENCOUNTER — NON-APPOINTMENT (OUTPATIENT)
Age: 68
End: 2023-09-13

## 2023-09-13 ENCOUNTER — APPOINTMENT (OUTPATIENT)
Dept: CARDIOLOGY | Facility: CLINIC | Age: 68
End: 2023-09-13
Payer: COMMERCIAL

## 2023-09-13 VITALS
SYSTOLIC BLOOD PRESSURE: 143 MMHG | RESPIRATION RATE: 16 BRPM | HEART RATE: 58 BPM | BODY MASS INDEX: 30.01 KG/M2 | DIASTOLIC BLOOD PRESSURE: 86 MMHG | WEIGHT: 198 LBS | HEIGHT: 68 IN

## 2023-09-13 DIAGNOSIS — I42.8 OTHER CARDIOMYOPATHIES: ICD-10-CM

## 2023-09-13 DIAGNOSIS — E78.5 HYPERLIPIDEMIA, UNSPECIFIED: ICD-10-CM

## 2023-09-13 DIAGNOSIS — I10 ESSENTIAL (PRIMARY) HYPERTENSION: ICD-10-CM

## 2023-09-13 DIAGNOSIS — I49.3 VENTRICULAR PREMATURE DEPOLARIZATION: ICD-10-CM

## 2023-09-13 PROCEDURE — 93000 ELECTROCARDIOGRAM COMPLETE: CPT

## 2023-09-13 PROCEDURE — 99214 OFFICE O/P EST MOD 30 MIN: CPT | Mod: 25

## 2023-09-13 RX ORDER — METOPROLOL SUCCINATE 50 MG/1
50 TABLET, EXTENDED RELEASE ORAL
Qty: 180 | Refills: 1 | Status: ACTIVE | COMMUNITY
Start: 2021-03-11 | End: 1900-01-01

## 2023-09-13 RX ORDER — TELMISARTAN 80 MG/1
80 TABLET ORAL
Qty: 90 | Refills: 1 | Status: ACTIVE | COMMUNITY
Start: 2021-12-01 | End: 1900-01-01

## 2023-10-16 ENCOUNTER — APPOINTMENT (OUTPATIENT)
Dept: CARDIOLOGY | Facility: CLINIC | Age: 68
End: 2023-10-16

## 2023-11-07 ENCOUNTER — APPOINTMENT (OUTPATIENT)
Dept: ENDOCRINOLOGY | Facility: CLINIC | Age: 68
End: 2023-11-07

## 2024-01-02 ENCOUNTER — APPOINTMENT (OUTPATIENT)
Dept: CARDIOLOGY | Facility: CLINIC | Age: 69
End: 2024-01-02

## 2024-03-07 ENCOUNTER — NON-APPOINTMENT (OUTPATIENT)
Age: 69
End: 2024-03-07

## 2024-04-25 RX ORDER — METFORMIN HYDROCHLORIDE 500 MG/1
500 TABLET, COATED ORAL
Qty: 4 | Refills: 0 | Status: ACTIVE | COMMUNITY
Start: 2021-01-18 | End: 1900-01-01

## 2024-09-30 ENCOUNTER — APPOINTMENT (OUTPATIENT)
Dept: ENDOCRINOLOGY | Facility: CLINIC | Age: 69
End: 2024-09-30
Payer: COMMERCIAL

## 2024-09-30 VITALS — SYSTOLIC BLOOD PRESSURE: 145 MMHG | DIASTOLIC BLOOD PRESSURE: 80 MMHG

## 2024-09-30 VITALS
SYSTOLIC BLOOD PRESSURE: 187 MMHG | HEART RATE: 77 BPM | WEIGHT: 204 LBS | HEIGHT: 68 IN | OXYGEN SATURATION: 99 % | BODY MASS INDEX: 30.92 KG/M2 | DIASTOLIC BLOOD PRESSURE: 105 MMHG

## 2024-09-30 VITALS — SYSTOLIC BLOOD PRESSURE: 168 MMHG | DIASTOLIC BLOOD PRESSURE: 80 MMHG

## 2024-09-30 DIAGNOSIS — E29.1 TESTICULAR HYPOFUNCTION: ICD-10-CM

## 2024-09-30 DIAGNOSIS — E66.9 OBESITY, UNSPECIFIED: ICD-10-CM

## 2024-09-30 DIAGNOSIS — E78.5 HYPERLIPIDEMIA, UNSPECIFIED: ICD-10-CM

## 2024-09-30 DIAGNOSIS — E11.9 TYPE 2 DIABETES MELLITUS W/OUT COMPLICATIONS: ICD-10-CM

## 2024-09-30 LAB — HBA1C MFR BLD HPLC: 8.8

## 2024-09-30 PROCEDURE — 99214 OFFICE O/P EST MOD 30 MIN: CPT | Mod: 25

## 2024-09-30 PROCEDURE — 83036 HEMOGLOBIN GLYCOSYLATED A1C: CPT | Mod: QW

## 2024-09-30 RX ORDER — SEMAGLUTIDE 0.68 MG/ML
2 INJECTION, SOLUTION SUBCUTANEOUS
Qty: 3 | Refills: 0 | Status: ACTIVE | COMMUNITY
Start: 2024-09-30 | End: 1900-01-01

## 2024-09-30 NOTE — ASSESSMENT
[Diabetes Foot Care] : diabetes foot care [Long Term Vascular Complications] : long term vascular complications of diabetes [Carbohydrate Consistent Diet] : carbohydrate consistent diet [Importance of Diet and Exercise] : importance of diet and exercise to improve glycemic control, achieve weight loss and improve cardiovascular health [Exercise/Effect on Glucose] : exercise/effect on glucose [Self Monitoring of Blood Glucose] : self monitoring of blood glucose [Retinopathy Screening] : Patient was referred to ophthalmology for retinopathy screening [Weight Loss] : weight loss [FreeTextEntry1] : 69 year old male with PMH of NICM, frequent PVCs, BMI 29, Strabismus, Type 2 Diabetes since age 61, HTN, HLD, hypogonadism is presenting for follow up care for his Diabetes.   1. Type 2 diabetes mellitus. Point-of-care HbA1c 6.1% Dec 2022 --> 6.2% May 2023 --> 8.8% Sept 2024  -We discussed the cardiovascular and microvascular complications of uncontrolled diabetes. We discussed the importance of diet and exercise and lifestyle modification for glycemic control and weight loss. We discussed referral to our diabetes educator and nutrition. We discussed pharmacologic options for glycemic control and weight loss.  -Discontinue metformin 500mg BID due to severe GI s/e -Start Ozempic 0.25mg weekly for 4 weeks, then increase to 0.5 mg weekly. No h/o MTC, MEN2 or pancreatitis. GI s/e discussed.  -LDL <70 goal, Continue crestor 10mg  -Advised to Check SMBG 2x daily  -Opthal and Foot care discussed  -Urine ACR and lipids needed. Script given.  -Possible thyroid nodule on exam, send for sonogram next visit.   2. Hypogonadism  -Repeat Testosterone with FSH, LH, Prolactin, SHBG  -Discussed primary vs secondary hypogonadism  -Pt inclined to treat with IM injection for TRT from previous conversations.   Patient verbalized understanding of the above. All questions were answered to patient's satisfaction. Dispo: Patient will follow up in 3 months. Discuss above labs over the phone.

## 2024-09-30 NOTE — REVIEW OF SYSTEMS
[As Noted in HPI] : as noted in HPI [Visual Field Defect] : visual field defect [Negative] : Heme/Lymph

## 2024-09-30 NOTE — HISTORY OF PRESENT ILLNESS
[FreeTextEntry1] :  is presenting for follow up care for his Diabetes and Hypogonadism.   69 year old male with PMH of NICM, frequent PVCs, BMI 29, Strabismus, Type 2 Diabetes since age 61, HTN, HLD, hypogonadism.   Reports no microvascular complications, no history of retinopathy, nephropathy or neuropathy.  Reports no history of cardiovascular risk factors, no history of CAD, CHF or CVA in the past.   Current DM meds:  Prior DM meds: Glipizide. Never on insulin or GLP-1s, Metformin 500mg BID (has severe diarrhea daily).  Other pertinent meds:   POCT A1c%: 9.7% Aug 2022 --> 6.1% Dec 2022 --> 6.2% May 2023 --> 8.8% Sept 2024   POCT BG: Not checked in 6 months  Diet:  Breakfast: Coffee with low carb toast  Lunch: Skips  Dinner: Protein (chicken or pork) with vegetable or salad.  Snacks: Nuts. No soda or juice. Fruits: Minimal.   Exercise: None.  Urine micro: Aug 2022 wnl ACE: C-peptide:  Cr: 1.16 GFR: 69 Lipid profile: Dec 2022 LDL 75, TGs 212 Statin: [id number]  HbA1c%:  Ophthalmology: Jan 2023 No DR.  Neuropathy: Minimal in left foot only Podiatry/Diabetic foot exam:   #Hypogonadism  Aug 2022 Testosterone 180 (drawn 10am) June 2021 Testosterone 266 7am  Jan 2021 Testosterone 153 (drawn 8am) Pt c/o to obtain erection, able to ejaculate.  Normal hair growth, no hair loss.  Never seen by urologist.    Interval hx:  Diet has been poor.  A1c 8.8% Today  Stopped metofmrin july 2024 due to severe diarrhea.

## 2024-09-30 NOTE — PHYSICAL EXAM
[Alert] : alert [Well Nourished] : well nourished [No Acute Distress] : no acute distress [EOMI] : extra ocular movement intact [Normal Hearing] : hearing was normal [No Neck Mass] : no neck mass was observed [Thyroid Not Enlarged] : the thyroid was not enlarged [No Respiratory Distress] : no respiratory distress [Normal Rate] : heart rate was normal [Right foot was examined, including] : right foot ~C was examined, including visual inspection with sensory and pulse exams [Left foot was examined, including] : left foot ~C was examined, including visual inspection with sensory and pulse exams [Normal] : normal [Vibration Dec.] : normal vibratory sensation at the level of the toes [Position Sense Dec.] : normal position sense at the level of the toes [Diminished Throughout Both Feet] : normal tactile sensation with monofilament testing throughout both feet

## 2024-11-14 DIAGNOSIS — E29.1 TESTICULAR HYPOFUNCTION: ICD-10-CM

## 2024-11-14 DIAGNOSIS — E11.9 TYPE 2 DIABETES MELLITUS W/OUT COMPLICATIONS: ICD-10-CM

## 2024-11-18 RX ORDER — TESTOSTERONE CYPIONATE 200 MG/ML
200 INJECTION, SOLUTION INTRAMUSCULAR
Qty: 1 | Refills: 0 | Status: ACTIVE | COMMUNITY
Start: 2024-11-14 | End: 1900-01-01

## 2024-11-18 RX ORDER — NEEDLES, DISPOSABLE 25GX5/8"
18G X 1-1/2" NEEDLE, DISPOSABLE MISCELLANEOUS
Qty: 10 | Refills: 2 | Status: ACTIVE | COMMUNITY
Start: 2024-11-18 | End: 1900-01-01

## 2024-11-18 RX ORDER — SYRINGE, DISPOSABLE, 1 ML
3 ML SYRINGE, EMPTY DISPOSABLE MISCELLANEOUS
Qty: 10 | Refills: 0 | Status: ACTIVE | COMMUNITY
Start: 2024-11-18 | End: 1900-01-01

## 2024-11-18 RX ORDER — NEEDLES, DISPOSABLE 25GX5/8"
21G X 2" NEEDLE, DISPOSABLE MISCELLANEOUS
Qty: 10 | Refills: 0 | Status: ACTIVE | COMMUNITY
Start: 2024-11-18 | End: 1900-01-01

## 2024-12-03 ENCOUNTER — APPOINTMENT (OUTPATIENT)
Dept: INTERNAL MEDICINE | Facility: CLINIC | Age: 69
End: 2024-12-03
Payer: COMMERCIAL

## 2024-12-03 VITALS — TEMPERATURE: 97.3 F | WEIGHT: 195 LBS | HEART RATE: 78 BPM | BODY MASS INDEX: 29.65 KG/M2 | OXYGEN SATURATION: 98 %

## 2024-12-03 VITALS — SYSTOLIC BLOOD PRESSURE: 142 MMHG | DIASTOLIC BLOOD PRESSURE: 82 MMHG

## 2024-12-03 DIAGNOSIS — N52.9 MALE ERECTILE DYSFUNCTION, UNSPECIFIED: ICD-10-CM

## 2024-12-03 DIAGNOSIS — E11.9 TYPE 2 DIABETES MELLITUS W/OUT COMPLICATIONS: ICD-10-CM

## 2024-12-03 DIAGNOSIS — I10 ESSENTIAL (PRIMARY) HYPERTENSION: ICD-10-CM

## 2024-12-03 DIAGNOSIS — Z91.030 BEE ALLERGY STATUS: ICD-10-CM

## 2024-12-03 DIAGNOSIS — E78.5 HYPERLIPIDEMIA, UNSPECIFIED: ICD-10-CM

## 2024-12-03 DIAGNOSIS — Z12.11 ENCOUNTER FOR SCREENING FOR MALIGNANT NEOPLASM OF COLON: ICD-10-CM

## 2024-12-03 PROCEDURE — G2211 COMPLEX E/M VISIT ADD ON: CPT | Mod: NC

## 2024-12-03 PROCEDURE — 99214 OFFICE O/P EST MOD 30 MIN: CPT

## 2024-12-03 RX ORDER — AMLODIPINE AND VALSARTAN 5; 160 MG/1; MG/1
5-160 TABLET, FILM COATED ORAL DAILY
Qty: 30 | Refills: 0 | Status: ACTIVE | COMMUNITY
Start: 2024-12-03 | End: 1900-01-01

## 2024-12-04 ENCOUNTER — APPOINTMENT (OUTPATIENT)
Dept: ENDOCRINOLOGY | Facility: CLINIC | Age: 69
End: 2024-12-04

## 2024-12-04 DIAGNOSIS — E29.1 TESTICULAR HYPOFUNCTION: ICD-10-CM

## 2024-12-04 PROBLEM — Z91.030 BEE STING ALLERGY: Status: ACTIVE | Noted: 2024-12-03

## 2024-12-04 PROCEDURE — 96372 THER/PROPH/DIAG INJ SC/IM: CPT

## 2024-12-04 RX ADMIN — TESTOSTERONE CYPIONATE 75 MG/ML: 200 INJECTION INTRAMUSCULAR at 00:00

## 2024-12-06 RX ORDER — TESTOSTERONE CYPIONATE 200 MG/ML
200 VIAL (ML) INTRAMUSCULAR
Qty: 0 | Refills: 0 | Status: COMPLETED | OUTPATIENT
Start: 2024-12-04

## 2024-12-09 ENCOUNTER — APPOINTMENT (OUTPATIENT)
Dept: ENDOCRINOLOGY | Facility: CLINIC | Age: 69
End: 2024-12-09

## 2024-12-11 RX ORDER — EPINEPHRINE 0.3 MG/.3ML
0.3 INJECTION INTRAMUSCULAR
Qty: 1 | Refills: 1 | Status: ACTIVE | COMMUNITY
Start: 2024-12-11 | End: 1900-01-01

## 2024-12-18 ENCOUNTER — APPOINTMENT (OUTPATIENT)
Dept: ENDOCRINOLOGY | Facility: CLINIC | Age: 69
End: 2024-12-18

## 2024-12-20 ENCOUNTER — APPOINTMENT (OUTPATIENT)
Dept: INTERNAL MEDICINE | Facility: CLINIC | Age: 69
End: 2024-12-20

## 2025-01-08 ENCOUNTER — APPOINTMENT (OUTPATIENT)
Dept: ENDOCRINOLOGY | Facility: CLINIC | Age: 70
End: 2025-01-08
Payer: COMMERCIAL

## 2025-01-08 VITALS
HEART RATE: 68 BPM | HEIGHT: 68 IN | BODY MASS INDEX: 29.4 KG/M2 | OXYGEN SATURATION: 98 % | WEIGHT: 194 LBS | SYSTOLIC BLOOD PRESSURE: 159 MMHG | DIASTOLIC BLOOD PRESSURE: 88 MMHG

## 2025-01-08 DIAGNOSIS — E78.5 HYPERLIPIDEMIA, UNSPECIFIED: ICD-10-CM

## 2025-01-08 DIAGNOSIS — E29.1 TESTICULAR HYPOFUNCTION: ICD-10-CM

## 2025-01-08 DIAGNOSIS — E11.9 TYPE 2 DIABETES MELLITUS W/OUT COMPLICATIONS: ICD-10-CM

## 2025-01-08 LAB
ALBUMIN SERPL ELPH-MCNC: 4.6 G/DL
ALP BLD-CCNC: 75 U/L
ALT SERPL-CCNC: 22 U/L
ANION GAP SERPL CALC-SCNC: 12 MMOL/L
AST SERPL-CCNC: 19 U/L
BILIRUB DIRECT SERPL-MCNC: 0.1 MG/DL
BILIRUB INDIRECT SERPL-MCNC: 0.4 MG/DL
BILIRUB SERPL-MCNC: 0.5 MG/DL
BUN SERPL-MCNC: 15 MG/DL
CALCIUM SERPL-MCNC: 9.6 MG/DL
CHLORIDE SERPL-SCNC: 106 MMOL/L
CHOLEST SERPL-MCNC: 175 MG/DL
CO2 SERPL-SCNC: 22 MMOL/L
CREAT SERPL-MCNC: 1.09 MG/DL
EGFR: 74 ML/MIN/1.73M2
ESTIMATED AVERAGE GLUCOSE: 137 MG/DL
GLUCOSE SERPL-MCNC: 153 MG/DL
HBA1C MFR BLD HPLC: 6.4 %
HDLC SERPL-MCNC: 33 MG/DL
LDLC SERPL CALC-MCNC: 107 MG/DL
NONHDLC SERPL-MCNC: 142 MG/DL
POTASSIUM SERPL-SCNC: 4 MMOL/L
PROT SERPL-MCNC: 7.2 G/DL
SODIUM SERPL-SCNC: 141 MMOL/L
TRIGL SERPL-MCNC: 204 MG/DL

## 2025-01-08 PROCEDURE — 36415 COLL VENOUS BLD VENIPUNCTURE: CPT

## 2025-01-08 PROCEDURE — 99214 OFFICE O/P EST MOD 30 MIN: CPT

## 2025-01-10 LAB
TESTOST FREE SERPL-MCNC: 9.4 PG/ML
TESTOST SERPL-MCNC: 754 NG/DL

## 2025-04-11 ENCOUNTER — APPOINTMENT (OUTPATIENT)
Dept: INTERNAL MEDICINE | Facility: CLINIC | Age: 70
End: 2025-04-11
Payer: COMMERCIAL

## 2025-04-11 ENCOUNTER — NON-APPOINTMENT (OUTPATIENT)
Age: 70
End: 2025-04-11

## 2025-04-11 VITALS
WEIGHT: 191 LBS | OXYGEN SATURATION: 98 % | SYSTOLIC BLOOD PRESSURE: 160 MMHG | BODY MASS INDEX: 28.95 KG/M2 | HEIGHT: 68 IN | DIASTOLIC BLOOD PRESSURE: 88 MMHG | HEART RATE: 64 BPM

## 2025-04-11 DIAGNOSIS — I42.8 OTHER CARDIOMYOPATHIES: ICD-10-CM

## 2025-04-11 DIAGNOSIS — I10 ESSENTIAL (PRIMARY) HYPERTENSION: ICD-10-CM

## 2025-04-11 DIAGNOSIS — E78.5 HYPERLIPIDEMIA, UNSPECIFIED: ICD-10-CM

## 2025-04-11 PROCEDURE — G2211 COMPLEX E/M VISIT ADD ON: CPT | Mod: NC

## 2025-04-11 PROCEDURE — 93000 ELECTROCARDIOGRAM COMPLETE: CPT

## 2025-04-11 PROCEDURE — 99214 OFFICE O/P EST MOD 30 MIN: CPT

## 2025-04-11 RX ORDER — CHROMIUM 200 MCG
TABLET ORAL
Refills: 0 | Status: ACTIVE | COMMUNITY

## 2025-04-23 ENCOUNTER — RX RENEWAL (OUTPATIENT)
Age: 70
End: 2025-04-23

## 2025-05-01 ENCOUNTER — APPOINTMENT (OUTPATIENT)
Dept: INTERNAL MEDICINE | Facility: CLINIC | Age: 70
End: 2025-05-01

## 2025-05-01 VITALS
BODY MASS INDEX: 29.55 KG/M2 | DIASTOLIC BLOOD PRESSURE: 80 MMHG | SYSTOLIC BLOOD PRESSURE: 154 MMHG | HEART RATE: 68 BPM | OXYGEN SATURATION: 98 % | WEIGHT: 195 LBS | HEIGHT: 68 IN

## 2025-05-01 DIAGNOSIS — E78.5 HYPERLIPIDEMIA, UNSPECIFIED: ICD-10-CM

## 2025-05-01 DIAGNOSIS — I10 ESSENTIAL (PRIMARY) HYPERTENSION: ICD-10-CM

## 2025-05-01 DIAGNOSIS — Z12.5 ENCOUNTER FOR SCREENING FOR MALIGNANT NEOPLASM OF PROSTATE: ICD-10-CM

## 2025-05-01 DIAGNOSIS — E29.1 TESTICULAR HYPOFUNCTION: ICD-10-CM

## 2025-05-01 DIAGNOSIS — E11.9 TYPE 2 DIABETES MELLITUS W/OUT COMPLICATIONS: ICD-10-CM

## 2025-05-01 PROCEDURE — G2211 COMPLEX E/M VISIT ADD ON: CPT | Mod: NC

## 2025-05-01 PROCEDURE — 99214 OFFICE O/P EST MOD 30 MIN: CPT

## 2025-05-01 RX ORDER — METOPROLOL SUCCINATE 25 MG/1
25 TABLET, EXTENDED RELEASE ORAL
Qty: 90 | Refills: 0 | Status: ACTIVE | COMMUNITY
Start: 2025-05-01 | End: 1900-01-01

## 2025-05-09 ENCOUNTER — APPOINTMENT (OUTPATIENT)
Dept: ENDOCRINOLOGY | Facility: CLINIC | Age: 70
End: 2025-05-09

## 2025-05-09 LAB
ALBUMIN SERPL ELPH-MCNC: 4.2 G/DL
ALP BLD-CCNC: 73 U/L
ALT SERPL-CCNC: 31 U/L
ANION GAP SERPL CALC-SCNC: 14 MMOL/L
AST SERPL-CCNC: 24 U/L
BASOPHILS # BLD AUTO: 0.05 K/UL
BASOPHILS NFR BLD AUTO: 0.8 %
BILIRUB SERPL-MCNC: 0.4 MG/DL
BUN SERPL-MCNC: 15 MG/DL
CALCIUM SERPL-MCNC: 9.2 MG/DL
CHLORIDE SERPL-SCNC: 106 MMOL/L
CHOLEST SERPL-MCNC: 125 MG/DL
CO2 SERPL-SCNC: 21 MMOL/L
CREAT SERPL-MCNC: 1.1 MG/DL
EGFRCR SERPLBLD CKD-EPI 2021: 73 ML/MIN/1.73M2
EOSINOPHIL # BLD AUTO: 0.15 K/UL
EOSINOPHIL NFR BLD AUTO: 2.3 %
ESTIMATED AVERAGE GLUCOSE: 160 MG/DL
GLUCOSE SERPL-MCNC: 161 MG/DL
HBA1C MFR BLD HPLC: 7.2 %
HCT VFR BLD CALC: 41.1 %
HDLC SERPL-MCNC: 27 MG/DL
HGB BLD-MCNC: 14.1 G/DL
IMM GRANULOCYTES NFR BLD AUTO: 0.3 %
LDLC SERPL-MCNC: 64 MG/DL
LYMPHOCYTES # BLD AUTO: 2.11 K/UL
LYMPHOCYTES NFR BLD AUTO: 32 %
MAN DIFF?: NORMAL
MCHC RBC-ENTMCNC: 28.7 PG
MCHC RBC-ENTMCNC: 34.3 G/DL
MCV RBC AUTO: 83.5 FL
MONOCYTES # BLD AUTO: 0.32 K/UL
MONOCYTES NFR BLD AUTO: 4.8 %
NEUTROPHILS # BLD AUTO: 3.95 K/UL
NEUTROPHILS NFR BLD AUTO: 59.8 %
NONHDLC SERPL-MCNC: 98 MG/DL
PLATELET # BLD AUTO: 165 K/UL
POTASSIUM SERPL-SCNC: 4.5 MMOL/L
PROT SERPL-MCNC: 6.5 G/DL
PSA SERPL-MCNC: 2.75 NG/ML
RBC # BLD: 4.92 M/UL
RBC # FLD: 13.3 %
SODIUM SERPL-SCNC: 141 MMOL/L
TRIGL SERPL-MCNC: 205 MG/DL
WBC # FLD AUTO: 6.6 K/UL

## 2025-05-10 LAB
CREAT SPEC-SCNC: 178 MG/DL
MICROALBUMIN 24H UR DL<=1MG/L-MCNC: 4.2 MG/DL
MICROALBUMIN/CREAT 24H UR-RTO: 24 MG/G

## 2025-05-21 ENCOUNTER — APPOINTMENT (OUTPATIENT)
Dept: ENDOCRINOLOGY | Facility: CLINIC | Age: 70
End: 2025-05-21
Payer: COMMERCIAL

## 2025-05-21 ENCOUNTER — RESULT CHARGE (OUTPATIENT)
Age: 70
End: 2025-05-21

## 2025-05-21 VITALS
HEART RATE: 70 BPM | WEIGHT: 197 LBS | DIASTOLIC BLOOD PRESSURE: 80 MMHG | HEIGHT: 68 IN | OXYGEN SATURATION: 96 % | SYSTOLIC BLOOD PRESSURE: 110 MMHG | BODY MASS INDEX: 29.86 KG/M2

## 2025-05-21 DIAGNOSIS — E29.1 TESTICULAR HYPOFUNCTION: ICD-10-CM

## 2025-05-21 DIAGNOSIS — E11.9 TYPE 2 DIABETES MELLITUS W/OUT COMPLICATIONS: ICD-10-CM

## 2025-05-21 DIAGNOSIS — E66.9 OBESITY, UNSPECIFIED: ICD-10-CM

## 2025-05-21 PROCEDURE — 82962 GLUCOSE BLOOD TEST: CPT

## 2025-05-21 PROCEDURE — 99214 OFFICE O/P EST MOD 30 MIN: CPT

## 2025-05-21 PROCEDURE — G2211 COMPLEX E/M VISIT ADD ON: CPT | Mod: NC

## 2025-05-21 RX ORDER — EMPAGLIFLOZIN 10 MG/1
10 TABLET, FILM COATED ORAL DAILY
Qty: 1 | Refills: 1 | Status: ACTIVE | COMMUNITY
Start: 2025-05-21 | End: 1900-01-01

## 2025-05-30 LAB
PSA FREE FLD-MCNC: 31 %
PSA FREE SERPL-MCNC: 0.69 NG/ML
PSA SERPL-MCNC: 2.23 NG/ML

## 2025-05-31 LAB
TESTOST FREE SERPL-MCNC: 13 PG/ML
TESTOST SERPL-MCNC: 691 NG/DL

## 2025-06-02 ENCOUNTER — APPOINTMENT (OUTPATIENT)
Dept: ENDOCRINOLOGY | Facility: CLINIC | Age: 70
End: 2025-06-02

## 2025-06-05 ENCOUNTER — TRANSCRIPTION ENCOUNTER (OUTPATIENT)
Age: 70
End: 2025-06-05

## 2025-07-21 ENCOUNTER — OFFICE (OUTPATIENT)
Dept: URBAN - METROPOLITAN AREA CLINIC 12 | Facility: CLINIC | Age: 70
Setting detail: OPHTHALMOLOGY
End: 2025-07-21
Payer: COMMERCIAL

## 2025-07-21 DIAGNOSIS — H25.13: ICD-10-CM

## 2025-07-21 DIAGNOSIS — H35.413: ICD-10-CM

## 2025-07-21 DIAGNOSIS — H01.004: ICD-10-CM

## 2025-07-21 DIAGNOSIS — H50.10: ICD-10-CM

## 2025-07-21 DIAGNOSIS — E11.9: ICD-10-CM

## 2025-07-21 DIAGNOSIS — H01.001: ICD-10-CM

## 2025-07-21 DIAGNOSIS — H25.11: ICD-10-CM

## 2025-07-21 DIAGNOSIS — H25.12: ICD-10-CM

## 2025-07-21 PROCEDURE — 92250 FUNDUS PHOTOGRAPHY W/I&R: CPT | Performed by: OPHTHALMOLOGY

## 2025-07-21 PROCEDURE — 92004 COMPRE OPH EXAM NEW PT 1/>: CPT | Performed by: OPHTHALMOLOGY

## 2025-07-21 ASSESSMENT — REFRACTION_CURRENTRX
OD_OVR_VA: 20/
OS_AXIS: 077
OS_ADD: +2.50
OS_VPRISM_DIRECTION: PROGS
OD_VPRISM_DIRECTION: PROGS
OS_SPHERE: -2.25
OD_ADD: +2.50
OD_AXIS: 120
OS_OVR_VA: 20/
OD_SPHERE: -2.75
OD_CYLINDER: -2.00
OS_CYLINDER: -2.25

## 2025-07-21 ASSESSMENT — REFRACTION_MANIFEST
OD_AXIS: 100
OS_AXIS: 080
OD_CYLINDER: -1.50
OD_VA1: 20/30-2
OS_SPHERE: -1.75
OS_VA1: 20/NI
OS_CYLINDER: -2.25
OD_SPHERE: -2.00

## 2025-07-21 ASSESSMENT — CONFRONTATIONAL VISUAL FIELD TEST (CVF)
OD_FINDINGS: FULL
OS_FINDINGS: FULL

## 2025-07-21 ASSESSMENT — KERATOMETRY
OD_AXISANGLE_DEGREES: 032
OD_K2POWER_DIOPTERS: 44.00
OS_AXISANGLE_DEGREES: 165
OS_K1POWER_DIOPTERS: 43.00
OS_K2POWER_DIOPTERS: 44.25
OD_K1POWER_DIOPTERS: 43.00

## 2025-07-21 ASSESSMENT — REFRACTION_AUTOREFRACTION
OD_CYLINDER: -1.50
OS_SPHERE: -1.75
OS_AXIS: 080
OD_AXIS: 101
OD_SPHERE: -2.00
OS_CYLINDER: -2.25

## 2025-07-21 ASSESSMENT — TONOMETRY
OD_IOP_MMHG: 17
OS_IOP_MMHG: 17

## 2025-07-21 ASSESSMENT — LID EXAM ASSESSMENTS
OD_BLEPHARITIS: RUL
OS_BLEPHARITIS: LUL

## 2025-07-21 ASSESSMENT — VISUAL ACUITY
OD_BCVA: 20/25+2
OS_BCVA: 20/40-

## 2025-07-28 ENCOUNTER — RX RENEWAL (OUTPATIENT)
Age: 70
End: 2025-07-28

## 2025-08-01 ENCOUNTER — OFFICE (OUTPATIENT)
Dept: URBAN - METROPOLITAN AREA CLINIC 12 | Facility: CLINIC | Age: 70
Setting detail: OPHTHALMOLOGY
End: 2025-08-01
Payer: COMMERCIAL

## 2025-08-01 DIAGNOSIS — H25.11: ICD-10-CM

## 2025-08-01 DIAGNOSIS — Z01.818: ICD-10-CM

## 2025-08-01 DIAGNOSIS — E11.9: ICD-10-CM

## 2025-08-01 DIAGNOSIS — H25.12: ICD-10-CM

## 2025-08-01 DIAGNOSIS — H25.13: ICD-10-CM

## 2025-08-01 PROBLEM — H35.413 LATTICE DEGENERATION; BOTH EYES: Status: ACTIVE | Noted: 2025-07-21

## 2025-08-01 PROBLEM — H50.10 EXOTROPIA, UNSPECIFIED: Status: ACTIVE | Noted: 2025-07-21

## 2025-08-01 PROBLEM — H01.004 BLEPHARITIS; RIGHT UPPER LID, LEFT UPPER LID: Status: ACTIVE | Noted: 2025-07-21

## 2025-08-01 PROBLEM — H43.393 VITREOUS FLOATERS; BOTH EYES: Status: ACTIVE | Noted: 2025-07-21

## 2025-08-01 PROBLEM — H01.001 BLEPHARITIS; RIGHT UPPER LID, LEFT UPPER LID: Status: ACTIVE | Noted: 2025-07-21

## 2025-08-01 PROCEDURE — 99212 OFFICE O/P EST SF 10 MIN: CPT

## 2025-08-04 ENCOUNTER — OFFICE (OUTPATIENT)
Dept: URBAN - METROPOLITAN AREA CLINIC 12 | Facility: CLINIC | Age: 70
Setting detail: OPHTHALMOLOGY
End: 2025-08-04
Payer: COMMERCIAL

## 2025-08-04 DIAGNOSIS — H25.11: ICD-10-CM

## 2025-08-04 DIAGNOSIS — H25.13: ICD-10-CM

## 2025-08-04 PROCEDURE — 99213 OFFICE O/P EST LOW 20 MIN: CPT | Performed by: OPHTHALMOLOGY

## 2025-08-04 PROCEDURE — 92136 OPHTHALMIC BIOMETRY: CPT | Mod: RT | Performed by: OPHTHALMOLOGY

## 2025-08-04 ASSESSMENT — REFRACTION_CURRENTRX
OS_VPRISM_DIRECTION: PROGS
OS_OVR_VA: 20/
OD_VPRISM_DIRECTION: PROGS
OS_ADD: +2.75
OD_CYLINDER: -2.00
OS_CYLINDER: -2.25
OS_AXIS: 076
OD_SPHERE: -2.75
OS_SPHERE: -2.25
OD_AXIS: 108
OD_ADD: +2.75
OD_OVR_VA: 20/

## 2025-08-04 ASSESSMENT — TONOMETRY
OD_IOP_MMHG: 21
OS_IOP_MMHG: 14

## 2025-08-04 ASSESSMENT — CONFRONTATIONAL VISUAL FIELD TEST (CVF)
OS_FINDINGS: FULL
OD_FINDINGS: FULL

## 2025-08-04 ASSESSMENT — KERATOMETRY
OD_AXISANGLE_DEGREES: 020
OD_K1POWER_DIOPTERS: 42.50
OS_K2POWER_DIOPTERS: 44.25
OS_K1POWER_DIOPTERS: 43.00
OD_K2POWER_DIOPTERS: 44.00
OS_AXISANGLE_DEGREES: 171

## 2025-08-04 ASSESSMENT — VISUAL ACUITY
OD_BCVA: 20/40
OS_BCVA: 20/100+1

## 2025-08-04 ASSESSMENT — REFRACTION_AUTOREFRACTION
OD_SPHERE: -2.50
OS_AXIS: 082
OS_SPHERE: -2.00
OD_AXIS: 111
OS_CYLINDER: -2.25
OD_CYLINDER: -2.25

## 2025-08-04 ASSESSMENT — LID EXAM ASSESSMENTS
OD_BLEPHARITIS: RUL
OS_BLEPHARITIS: LUL

## 2025-08-19 ENCOUNTER — ASC (OUTPATIENT)
Dept: URBAN - METROPOLITAN AREA SURGERY 8 | Facility: SURGERY | Age: 70
Setting detail: OPHTHALMOLOGY
End: 2025-08-19
Payer: COMMERCIAL

## 2025-08-19 DIAGNOSIS — H52.221: ICD-10-CM

## 2025-08-19 DIAGNOSIS — H25.11: ICD-10-CM

## 2025-08-19 PROCEDURE — FEMTO PRECISION LASER CATARACT SURGERY: Mod: GY | Performed by: OPHTHALMOLOGY

## 2025-08-19 PROCEDURE — 66984 XCAPSL CTRC RMVL W/O ECP: CPT | Mod: RT | Performed by: OPHTHALMOLOGY

## 2025-08-19 PROCEDURE — V2788LAL LIGHT ADJUSTABLE LENS (LAL): Performed by: OPHTHALMOLOGY

## 2025-08-20 ENCOUNTER — RX ONLY (RX ONLY)
Age: 70
End: 2025-08-20

## 2025-08-20 ENCOUNTER — OFFICE (OUTPATIENT)
Dept: URBAN - METROPOLITAN AREA CLINIC 12 | Facility: CLINIC | Age: 70
Setting detail: OPHTHALMOLOGY
End: 2025-08-20
Payer: COMMERCIAL

## 2025-08-20 DIAGNOSIS — H25.12: ICD-10-CM

## 2025-08-20 PROCEDURE — 92136 OPHTHALMIC BIOMETRY: CPT | Mod: LT | Performed by: OPHTHALMOLOGY

## 2025-08-20 ASSESSMENT — TONOMETRY
OD_IOP_MMHG: 21
OS_IOP_MMHG: 16

## 2025-08-20 ASSESSMENT — KERATOMETRY
OD_K2POWER_DIOPTERS: 43.25
OD_AXISANGLE_DEGREES: 33
OD_K1POWER_DIOPTERS: 43.00
OS_K2POWER_DIOPTERS: 44.50
OS_K1POWER_DIOPTERS: 43.25
OS_AXISANGLE_DEGREES: 169

## 2025-08-20 ASSESSMENT — REFRACTION_CURRENTRX
OD_ADD: +2.75
OS_OVR_VA: 20/
OD_AXIS: 108
OD_SPHERE: -2.75
OS_CYLINDER: -2.25
OS_VPRISM_DIRECTION: PROGS
OS_SPHERE: -2.25
OD_CYLINDER: -2.00
OS_AXIS: 076
OD_OVR_VA: 20/
OD_VPRISM_DIRECTION: PROGS
OS_ADD: +2.75

## 2025-08-20 ASSESSMENT — LID EXAM ASSESSMENTS
OD_BLEPHARITIS: RUL
OS_BLEPHARITIS: LUL

## 2025-08-20 ASSESSMENT — REFRACTION_AUTOREFRACTION
OS_AXIS: 80
OD_SPHERE: +1.50
OD_AXIS: 98
OD_CYLINDER: -1.00
OS_SPHERE: -2.00
OS_CYLINDER: -2.25

## 2025-08-20 ASSESSMENT — CONFRONTATIONAL VISUAL FIELD TEST (CVF)
OS_FINDINGS: FULL
OD_FINDINGS: FULL

## 2025-08-20 ASSESSMENT — VISUAL ACUITY
OS_BCVA: 20/40
OD_BCVA: 20/40

## 2025-08-26 ENCOUNTER — ASC (OUTPATIENT)
Dept: URBAN - METROPOLITAN AREA SURGERY 8 | Facility: SURGERY | Age: 70
Setting detail: OPHTHALMOLOGY
End: 2025-08-26
Payer: COMMERCIAL

## 2025-08-26 DIAGNOSIS — H52.222: ICD-10-CM

## 2025-08-26 DIAGNOSIS — H25.12: ICD-10-CM

## 2025-08-26 PROCEDURE — V2788LAL LIGHT ADJUSTABLE LENS (LAL): Performed by: OPHTHALMOLOGY

## 2025-08-26 PROCEDURE — 66984 XCAPSL CTRC RMVL W/O ECP: CPT | Mod: 79,LT | Performed by: OPHTHALMOLOGY

## 2025-08-26 PROCEDURE — FEMTO PRECISION LASER CATARACT SURGERY: Mod: GY | Performed by: OPHTHALMOLOGY

## 2025-08-27 ENCOUNTER — OFFICE (OUTPATIENT)
Dept: URBAN - METROPOLITAN AREA CLINIC 12 | Facility: CLINIC | Age: 70
Setting detail: OPHTHALMOLOGY
End: 2025-08-27
Payer: COMMERCIAL

## 2025-08-27 ENCOUNTER — RX ONLY (RX ONLY)
Age: 70
End: 2025-08-27

## 2025-08-27 DIAGNOSIS — Z96.1: ICD-10-CM

## 2025-08-27 PROBLEM — Z01.818 ENCOUNTER FOR OTHER PREPROCEDURAL EXAMINATION: Status: ACTIVE | Noted: 2025-08-01

## 2025-08-27 PROCEDURE — 99024 POSTOP FOLLOW-UP VISIT: CPT | Performed by: OPHTHALMOLOGY

## 2025-08-27 ASSESSMENT — REFRACTION_CURRENTRX
OD_SPHERE: -2.75
OD_OVR_VA: 20/
OS_ADD: +2.75
OD_VPRISM_DIRECTION: PROGS
OD_CYLINDER: -2.00
OS_CYLINDER: -2.25
OD_AXIS: 108
OS_AXIS: 076
OS_VPRISM_DIRECTION: PROGS
OD_ADD: +2.75
OS_SPHERE: -2.25
OS_OVR_VA: 20/

## 2025-08-27 ASSESSMENT — REFRACTION_AUTOREFRACTION
OS_AXIS: 084
OS_CYLINDER: -0.75
OD_AXIS: 093
OD_CYLINDER: -1.25
OS_SPHERE: +1.25
OD_SPHERE: +1.25

## 2025-08-27 ASSESSMENT — TONOMETRY: OS_IOP_MMHG: 20

## 2025-08-27 ASSESSMENT — KERATOMETRY
OS_K2POWER_DIOPTERS: 44.00
OD_K2POWER_DIOPTERS: 43.50
OD_K1POWER_DIOPTERS: 42.85
OD_AXISANGLE_DEGREES: 172
OS_AXISANGLE_DEGREES: 163
OS_K1POWER_DIOPTERS: 43.50

## 2025-08-27 ASSESSMENT — CONFRONTATIONAL VISUAL FIELD TEST (CVF)
OS_FINDINGS: FULL
OD_FINDINGS: FULL

## 2025-08-27 ASSESSMENT — VISUAL ACUITY
OS_BCVA: 20/20
OD_BCVA: 20/40

## 2025-08-27 ASSESSMENT — LID EXAM ASSESSMENTS
OD_BLEPHARITIS: RUL
OS_BLEPHARITIS: LUL

## 2025-09-03 ENCOUNTER — TRANSCRIPTION ENCOUNTER (OUTPATIENT)
Age: 70
End: 2025-09-03

## 2025-09-04 ENCOUNTER — TRANSCRIPTION ENCOUNTER (OUTPATIENT)
Age: 70
End: 2025-09-04

## 2025-09-08 ENCOUNTER — TRANSCRIPTION ENCOUNTER (OUTPATIENT)
Age: 70
End: 2025-09-08